# Patient Record
Sex: FEMALE | Race: BLACK OR AFRICAN AMERICAN | NOT HISPANIC OR LATINO | Employment: FULL TIME | URBAN - METROPOLITAN AREA
[De-identification: names, ages, dates, MRNs, and addresses within clinical notes are randomized per-mention and may not be internally consistent; named-entity substitution may affect disease eponyms.]

---

## 2017-01-06 ENCOUNTER — PREP FOR SURGERY (OUTPATIENT)
Dept: OBSTETRICS AND GYNECOLOGY | Facility: CLINIC | Age: 32
End: 2017-01-06

## 2017-01-06 DIAGNOSIS — N83.209 CYST OF OVARY, UNSPECIFIED LATERALITY: Primary | ICD-10-CM

## 2017-01-06 RX ORDER — SODIUM CHLORIDE 0.9 % (FLUSH) 0.9 %
1-10 SYRINGE (ML) INJECTION AS NEEDED
Status: CANCELLED | OUTPATIENT
Start: 2017-01-06

## 2017-01-16 ENCOUNTER — APPOINTMENT (OUTPATIENT)
Dept: PREADMISSION TESTING | Facility: HOSPITAL | Age: 32
End: 2017-01-16

## 2017-01-16 VITALS — WEIGHT: 158.73 LBS | BODY MASS INDEX: 29.97 KG/M2 | HEIGHT: 61 IN

## 2017-01-16 DIAGNOSIS — N80.9 ENDOMETRIOSIS: ICD-10-CM

## 2017-01-16 DIAGNOSIS — N83.209 CYST OF OVARY, UNSPECIFIED LATERALITY: ICD-10-CM

## 2017-01-16 LAB
BASOPHILS # BLD AUTO: 0.03 10*3/MM3 (ref 0–0.2)
BASOPHILS NFR BLD AUTO: 0.5 % (ref 0–1)
DEPRECATED RDW RBC AUTO: 40.5 FL (ref 37–54)
EOSINOPHIL # BLD AUTO: 0.21 10*3/MM3 (ref 0.1–0.3)
EOSINOPHIL NFR BLD AUTO: 3.7 % (ref 0–3)
ERYTHROCYTE [DISTWIDTH] IN BLOOD BY AUTOMATED COUNT: 12.2 % (ref 11.3–14.5)
FSH SERPL-ACNC: 1.2 MIU/ML
HCT VFR BLD AUTO: 40 % (ref 34.5–44)
HGB BLD-MCNC: 13.4 G/DL (ref 11.5–15.5)
IMM GRANULOCYTES # BLD: 0.02 10*3/MM3 (ref 0–0.03)
IMM GRANULOCYTES NFR BLD: 0.4 % (ref 0–0.6)
LYMPHOCYTES # BLD AUTO: 2.37 10*3/MM3 (ref 0.6–4.8)
LYMPHOCYTES NFR BLD AUTO: 41.9 % (ref 24–44)
MCH RBC QN AUTO: 30.4 PG (ref 27–31)
MCHC RBC AUTO-ENTMCNC: 33.5 G/DL (ref 32–36)
MCV RBC AUTO: 90.7 FL (ref 80–99)
MONOCYTES # BLD AUTO: 0.46 10*3/MM3 (ref 0–1)
MONOCYTES NFR BLD AUTO: 8.1 % (ref 0–12)
NEUTROPHILS # BLD AUTO: 2.56 10*3/MM3 (ref 1.5–8.3)
NEUTROPHILS NFR BLD AUTO: 45.4 % (ref 41–71)
PLATELET # BLD AUTO: 222 10*3/MM3 (ref 150–450)
PMV BLD AUTO: 11.1 FL (ref 6–12)
RBC # BLD AUTO: 4.41 10*6/MM3 (ref 3.89–5.14)
TSH SERPL DL<=0.05 MIU/L-ACNC: 1.03 MIU/ML (ref 0.35–5.35)
WBC NRBC COR # BLD: 5.65 10*3/MM3 (ref 3.5–10.8)

## 2017-01-16 PROCEDURE — 85025 COMPLETE CBC W/AUTO DIFF WBC: CPT | Performed by: OBSTETRICS & GYNECOLOGY

## 2017-01-16 PROCEDURE — 83001 ASSAY OF GONADOTROPIN (FSH): CPT | Performed by: OBSTETRICS & GYNECOLOGY

## 2017-01-16 PROCEDURE — 84443 ASSAY THYROID STIM HORMONE: CPT | Performed by: OBSTETRICS & GYNECOLOGY

## 2017-01-16 PROCEDURE — 36415 COLL VENOUS BLD VENIPUNCTURE: CPT

## 2017-01-17 ENCOUNTER — PREP FOR SURGERY (OUTPATIENT)
Dept: OBSTETRICS AND GYNECOLOGY | Facility: CLINIC | Age: 32
End: 2017-01-17

## 2017-01-17 NOTE — H&P
Patient Care Team:  No Known Provider as PCP - General    Chief complaint   Chronic pelvic pain post hysterectomy and left oopherectomy. Now eith right ovarian cyst.    Subjective     Patient is a 31 y.o. female presents with chronic pelvic pain. Onset of symptoms was gradual starting 1 year ago.  Symptoms are associated with nothing but worse with intercourse.  Symptoms are aggravated by intercourse.   Symptoms improve with none. Severity moderate Context as above Quality cramp like    Review of Systems   The following systems were reviewed and negative;  constitution, respiratory, cardiovascular, gastrointestinal, genitourinary, integument, hematologic / lymphatic, musculoskeletal, neurological, behavioral/psych and endocrine    History  Past Medical History   Diagnosis Date   • Abnormal Pap smear of cervix    • Endometriosis    • Migraine    • Urinary tract infection      Past Surgical History   Procedure Laterality Date   • Hysteroscopy     • Diagnostic laparoscopy       ovarian cyst, also ectopic pregancy left   • Tonsillectomy       Family History   Problem Relation Age of Onset   • Hypertension Father    • Heart disease Father    • Diabetes Paternal Grandmother    • Hypertension Paternal Grandmother    • Liver cancer Maternal Grandmother      Social History   Substance Use Topics   • Smoking status: Never Smoker   • Smokeless tobacco: Never Used   • Alcohol use 1.2 oz/week     2 Glasses of wine per week      Comment: per week       (Not in a hospital admission)  Allergies:  Penicillins    Objective     Vital Signs       Physical Exam:      General Appearance:    Alert, cooperative, in no acute distress   Head:    Normocephalic, without obvious abnormality, atraumatic   Eyes:            Lids and lashes normal, conjunctivae and sclerae normal, no   icterus, no pallor, corneas clear, PERRLA   Ears:    Ears appear intact with no abnormalities noted   Throat:   No oral lesions, no thrush, oral mucosa moist    Neck:   No adenopathy, supple, trachea midline, no thyromegaly, no     carotid bruit, no JVD   Back:     No kyphosis present, no scoliosis present, no skin lesions,       erythema or scars, no tenderness to percussion or                   palpation,   range of motion normal   Lungs:     Clear to auscultation,respirations regular, even and                   unlabored    Heart:    Regular rhythm and normal rate, normal S1 and S2, no            murmur, no gallop, no rub, no click   Breast Exam:    Deferred   Abdomen:     Normal bowel sounds, no masses, no organomegaly, soft        non-tender, non-distended, no guarding, no rebound                 tenderness   Genitalia:    Deferred   Extremities:   Moves all extremities well, no edema, no cyanosis, no              redness   Pulses:   Pulses palpable and equal bilaterally   Skin:   No bleeding, bruising or rash   Lymph nodes:   No palpable adenopathy   Neurologic:   Cranial nerves 2 - 12 grossly intact, sensation intact, DTR        present and equal bilaterally       Results Review:    I reviewed the patient's new imaging results and agree with the interpretation.    Assessment/Plan     Active Problems:    * No active hospital problems. *      Chronic pelvic pain  Right ovarian cyst  History of surgical removal of uterus and Left ovary    Plan laparoscopic ovarian cystectomy vs oopherectomyh    I discussed the patients findings and my recommendations with patient.     Troy Escobar MD  01/17/17  5:16 PM    Time: More than 50% of time spent in counseling and coordination of care:  Total face-to-face/floor time 30 min.  Time spent in counseling 25 min. Counseling included the following topics: surgical management risk benefits limitations ERT

## 2017-01-18 ENCOUNTER — ANESTHESIA EVENT (OUTPATIENT)
Dept: PERIOP | Facility: HOSPITAL | Age: 32
End: 2017-01-18

## 2017-01-18 ENCOUNTER — ANESTHESIA (OUTPATIENT)
Dept: PERIOP | Facility: HOSPITAL | Age: 32
End: 2017-01-18

## 2017-01-18 PROBLEM — N83.209 OVARIAN CYST: Status: ACTIVE | Noted: 2017-01-18

## 2017-01-18 PROCEDURE — 25010000002 FENTANYL CITRATE (PF) 100 MCG/2ML SOLUTION: Performed by: NURSE ANESTHETIST, CERTIFIED REGISTERED

## 2017-01-18 PROCEDURE — 25010000002 ONDANSETRON PER 1 MG: Performed by: NURSE ANESTHETIST, CERTIFIED REGISTERED

## 2017-01-18 PROCEDURE — 25010000002 PROPOFOL 10 MG/ML EMULSION: Performed by: NURSE ANESTHETIST, CERTIFIED REGISTERED

## 2017-01-18 PROCEDURE — 25010000002 DEXAMETHASONE PER 1 MG: Performed by: NURSE ANESTHETIST, CERTIFIED REGISTERED

## 2017-01-18 PROCEDURE — 25010000002 NEOSTIGMINE PER 0.5 MG: Performed by: NURSE ANESTHETIST, CERTIFIED REGISTERED

## 2017-01-18 PROCEDURE — 25010000002 MIDAZOLAM PER 1 MG: Performed by: NURSE ANESTHETIST, CERTIFIED REGISTERED

## 2017-01-18 RX ORDER — DEXAMETHASONE SODIUM PHOSPHATE 4 MG/ML
INJECTION, SOLUTION INTRA-ARTICULAR; INTRALESIONAL; INTRAMUSCULAR; INTRAVENOUS; SOFT TISSUE AS NEEDED
Status: DISCONTINUED | OUTPATIENT
Start: 2017-01-18 | End: 2017-01-18 | Stop reason: SURG

## 2017-01-18 RX ORDER — PROPOFOL 10 MG/ML
VIAL (ML) INTRAVENOUS CONTINUOUS PRN
Status: DISCONTINUED | OUTPATIENT
Start: 2017-01-18 | End: 2017-01-18 | Stop reason: SURG

## 2017-01-18 RX ORDER — ONDANSETRON 2 MG/ML
INJECTION INTRAMUSCULAR; INTRAVENOUS AS NEEDED
Status: DISCONTINUED | OUTPATIENT
Start: 2017-01-18 | End: 2017-01-18 | Stop reason: SURG

## 2017-01-18 RX ORDER — LIDOCAINE HYDROCHLORIDE 10 MG/ML
INJECTION, SOLUTION INFILTRATION; PERINEURAL AS NEEDED
Status: DISCONTINUED | OUTPATIENT
Start: 2017-01-18 | End: 2017-01-18 | Stop reason: SURG

## 2017-01-18 RX ORDER — ROCURONIUM BROMIDE 10 MG/ML
INJECTION, SOLUTION INTRAVENOUS AS NEEDED
Status: DISCONTINUED | OUTPATIENT
Start: 2017-01-18 | End: 2017-01-18 | Stop reason: SURG

## 2017-01-18 RX ORDER — GLYCOPYRROLATE 0.2 MG/ML
INJECTION INTRAMUSCULAR; INTRAVENOUS AS NEEDED
Status: DISCONTINUED | OUTPATIENT
Start: 2017-01-18 | End: 2017-01-18 | Stop reason: SURG

## 2017-01-18 RX ORDER — FENTANYL CITRATE 50 UG/ML
INJECTION, SOLUTION INTRAMUSCULAR; INTRAVENOUS AS NEEDED
Status: DISCONTINUED | OUTPATIENT
Start: 2017-01-18 | End: 2017-01-18 | Stop reason: SURG

## 2017-01-18 RX ORDER — MIDAZOLAM HYDROCHLORIDE 1 MG/ML
INJECTION INTRAMUSCULAR; INTRAVENOUS AS NEEDED
Status: DISCONTINUED | OUTPATIENT
Start: 2017-01-18 | End: 2017-01-18 | Stop reason: SURG

## 2017-01-18 RX ORDER — PROPOFOL 10 MG/ML
VIAL (ML) INTRAVENOUS AS NEEDED
Status: DISCONTINUED | OUTPATIENT
Start: 2017-01-18 | End: 2017-01-18 | Stop reason: SURG

## 2017-01-18 RX ADMIN — SODIUM CHLORIDE, POTASSIUM CHLORIDE, SODIUM LACTATE AND CALCIUM CHLORIDE: 600; 310; 30; 20 INJECTION, SOLUTION INTRAVENOUS at 10:08

## 2017-01-18 RX ADMIN — FENTANYL CITRATE 75 MCG: 50 INJECTION, SOLUTION INTRAMUSCULAR; INTRAVENOUS at 09:23

## 2017-01-18 RX ADMIN — GLYCOPYRROLATE 0.1 MG: 0.2 INJECTION, SOLUTION INTRAMUSCULAR; INTRAVENOUS at 09:21

## 2017-01-18 RX ADMIN — MIDAZOLAM HYDROCHLORIDE 2 MG: 1 INJECTION, SOLUTION INTRAMUSCULAR; INTRAVENOUS at 09:20

## 2017-01-18 RX ADMIN — LIDOCAINE HYDROCHLORIDE 50 MG: 10 INJECTION, SOLUTION INFILTRATION; PERINEURAL at 09:24

## 2017-01-18 RX ADMIN — ROCURONIUM BROMIDE 25 MG: 10 INJECTION INTRAVENOUS at 09:25

## 2017-01-18 RX ADMIN — DEXAMETHASONE SODIUM PHOSPHATE 8 MG: 4 INJECTION, SOLUTION INTRAMUSCULAR; INTRAVENOUS at 09:30

## 2017-01-18 RX ADMIN — GLYCOPYRROLATE 0.4 MG: 0.2 INJECTION, SOLUTION INTRAMUSCULAR; INTRAVENOUS at 10:00

## 2017-01-18 RX ADMIN — Medication 3 MG: at 10:00

## 2017-01-18 RX ADMIN — PROPOFOL 25 MCG/KG/MIN: 10 INJECTION, EMULSION INTRAVENOUS at 09:40

## 2017-01-18 RX ADMIN — GLYCOPYRROLATE 0.1 MG: 0.2 INJECTION, SOLUTION INTRAMUSCULAR; INTRAVENOUS at 09:35

## 2017-01-18 RX ADMIN — ONDANSETRON 4 MG: 2 INJECTION INTRAMUSCULAR; INTRAVENOUS at 09:55

## 2017-01-18 RX ADMIN — PROPOFOL 150 MG: 10 INJECTION, EMULSION INTRAVENOUS at 09:24

## 2017-02-02 ENCOUNTER — OFFICE VISIT (OUTPATIENT)
Dept: OBSTETRICS AND GYNECOLOGY | Facility: CLINIC | Age: 32
End: 2017-02-02

## 2017-02-02 VITALS
WEIGHT: 158 LBS | SYSTOLIC BLOOD PRESSURE: 110 MMHG | DIASTOLIC BLOOD PRESSURE: 62 MMHG | BODY MASS INDEX: 29.83 KG/M2 | HEIGHT: 61 IN

## 2017-02-02 DIAGNOSIS — R10.2 CHRONIC PELVIC PAIN IN FEMALE: Primary | ICD-10-CM

## 2017-02-02 DIAGNOSIS — G89.29 CHRONIC PELVIC PAIN IN FEMALE: Primary | ICD-10-CM

## 2017-02-02 PROBLEM — Z98.890 POSTOPERATIVE STATE: Status: ACTIVE | Noted: 2017-02-02

## 2017-02-02 PROCEDURE — 99024 POSTOP FOLLOW-UP VISIT: CPT | Performed by: OBSTETRICS & GYNECOLOGY

## 2017-04-03 ENCOUNTER — APPOINTMENT (OUTPATIENT)
Dept: GENERAL RADIOLOGY | Facility: HOSPITAL | Age: 32
End: 2017-04-03

## 2017-04-03 ENCOUNTER — HOSPITAL ENCOUNTER (EMERGENCY)
Facility: HOSPITAL | Age: 32
Discharge: HOME | End: 2017-04-03
Attending: EMERGENCY MEDICINE | Admitting: EMERGENCY MEDICINE

## 2017-04-03 VITALS
RESPIRATION RATE: 18 BRPM | WEIGHT: 158 LBS | BODY MASS INDEX: 29.83 KG/M2 | HEART RATE: 59 BPM | SYSTOLIC BLOOD PRESSURE: 134 MMHG | OXYGEN SATURATION: 98 % | TEMPERATURE: 98.1 F | DIASTOLIC BLOOD PRESSURE: 96 MMHG | HEIGHT: 61 IN

## 2017-04-03 DIAGNOSIS — S16.1XXA CERVICAL STRAIN, ACUTE, INITIAL ENCOUNTER: ICD-10-CM

## 2017-04-03 DIAGNOSIS — S29.019A THORACIC MYOFASCIAL STRAIN, INITIAL ENCOUNTER: ICD-10-CM
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
## 2017-04-03 PROCEDURE — 72040 X-RAY EXAM NECK SPINE 2-3 VW: CPT

## 2017-04-03 PROCEDURE — 72072 X-RAY EXAM THORAC SPINE 3VWS: CPT

## 2017-04-03 PROCEDURE — 99284 EMERGENCY DEPT VISIT MOD MDM: CPT

## 2017-04-03 RX ORDER — NAPROXEN 500 MG/1
500 TABLET ORAL 2 TIMES DAILY PRN
Qty: 14 TABLET | Refills: 0 | Status: SHIPPED | OUTPATIENT
Start: 2017-04-03 | End: 2017-10-10

## 2017-04-03 RX ORDER — NAPROXEN 500 MG/1
500 TABLET ORAL ONCE
Status: COMPLETED | OUTPATIENT
Start: 2017-04-03 | End: 2017-04-03

## 2017-04-03 RX ORDER — CYCLOBENZAPRINE HCL 10 MG
10 TABLET ORAL NIGHTLY PRN
Qty: 7 TABLET | Refills: 0 | Status: SHIPPED | OUTPATIENT
Start: 2017-04-03 | End: 2017-10-10

## 2017-04-03 RX ADMIN — NAPROXEN 500 MG: 500 TABLET ORAL at 20:13
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
## 2017-04-04 NOTE — DISCHARGE INSTRUCTIONS
Follow-up with your doctor in 3-5 days if not improving for further workup, treatment and evaluation.

## 2017-06-15 ENCOUNTER — TELEPHONE (OUTPATIENT)
Dept: URGENT CARE | Facility: CLINIC | Age: 32
End: 2017-06-15

## 2017-06-15 NOTE — TELEPHONE ENCOUNTER
Dr. Ramsey changed prescription to Cipro and sent it to her pharmacy, attempted to contact her to make sure she knew of the new Rx and benedryl as needed. Phone not accepting calls at this time.

## 2017-06-15 NOTE — TELEPHONE ENCOUNTER
----- Message from Karely Solano sent at 6/15/2017  9:55 AM EDT -----  Contact: PATIENT  SAYS SHES HAVING ALLERGIC RX TO MEDS, IF YOU COULD CHANGE IT.

## 2017-06-20 ENCOUNTER — TELEPHONE (OUTPATIENT)
Dept: URGENT CARE | Facility: CLINIC | Age: 32
End: 2017-06-20

## 2017-06-20 NOTE — TELEPHONE ENCOUNTER
----- Message from DIANE Quintero sent at 6/20/2017  9:20 AM EDT -----  Please call the patient regarding her abnormal result. Patient was allergic to the Bactrim, and it was switched to Cipro, which the bacteria is sensitive to. Check on patient's status. Her symptoms should be resolved.

## 2017-06-20 NOTE — TELEPHONE ENCOUNTER
Attempted to contact patient to see how she is doing, no change in treatment necessary, her phone is not accepting calls at this time.

## 2017-10-10 ENCOUNTER — OFFICE VISIT (OUTPATIENT)
Dept: OBSTETRICS AND GYNECOLOGY | Facility: CLINIC | Age: 32
End: 2017-10-10

## 2017-10-10 VITALS
SYSTOLIC BLOOD PRESSURE: 119 MMHG | BODY MASS INDEX: 25.3 KG/M2 | WEIGHT: 134 LBS | HEIGHT: 61 IN | DIASTOLIC BLOOD PRESSURE: 60 MMHG

## 2017-10-10 DIAGNOSIS — Z01.419 ENCOUNTER FOR GYNECOLOGICAL EXAMINATION WITHOUT ABNORMAL FINDING: ICD-10-CM

## 2017-10-10 DIAGNOSIS — N95.1 MENOPAUSAL SYMPTOMS: Primary | ICD-10-CM

## 2017-10-10 DIAGNOSIS — N93.0 POSTCOITAL BLEEDING: ICD-10-CM

## 2017-10-10 DIAGNOSIS — N95.2 POSTMENOPAUSAL ATROPHIC VAGINITIS: ICD-10-CM

## 2017-10-10 PROCEDURE — 99385 PREV VISIT NEW AGE 18-39: CPT | Performed by: OBSTETRICS & GYNECOLOGY

## 2017-10-10 NOTE — PROGRESS NOTES
Subjective  Chief Complaint   Patient presents with   • Gynecologic Exam     patient advised having spotting after intercourse, is having some vaginal dryness.    • Hot Flashes     Patient is 32 y.o.  here for annual with complaints of vaginal dryness and spotting post coitus.  Pt with previous LAVH/LSO  with Dr. Boyd secondary to endometriosis and ovarian cyst.  Pt had Dx lap with RSO 2017 with Dr. Escobar.  Pt is having vaginal dryness and pain with intercourse.  Pt had been given estradiol 1mg and reports taking x 2 months with no improvement so patient discontinued.  Pt with continued symptoms as well as hot flashes.    History  Past Medical History:   Diagnosis Date   • Abnormal Pap smear of cervix    • Endometriosis    • Migraine    • Ovarian cyst    • Urinary tract infection      No current outpatient prescriptions on file prior to visit.     No current facility-administered medications on file prior to visit.      Allergies   Allergen Reactions   • Penicillins Swelling   • Sulfa Antibiotics      Past Surgical History:   Procedure Laterality Date   • DIAGNOSTIC LAPAROSCOPY      ovarian cyst, also ectopic pregancy left   • ECTOPIC PREGNANCY Right     WITH RSO, @    • HYSTERECTOMY      [facility] @ [facility]   • HYSTEROSCOPY     • OOPHORECTOMY Left    • OVARIAN CYST DRAINAGE/EXCISION N/A 2017    Procedure: LAPAROSCOPIC RIGHT SALPINGOOPHORECTOMY;  Surgeon: Troy Escobar MD;  Location: [facility];  Service:    • TONSILLECTOMY       Family History   Problem Relation Age of Onset   • Hypertension Father    • Heart disease Father    • Diabetes Paternal Grandmother    • Hypertension Paternal Grandmother    • Liver cancer Maternal Grandmother      Social History     Social History   • Marital status:      Spouse name: N/A   • Number of children: N/A   • Years of education: N/A     Social History Main Topics   • Smoking status: Never Smoker   • Smokeless tobacco: Never  "Used   • Alcohol use 1.2 oz/week     2 Glasses of wine per week      Comment: per week   • Drug use: No   • Sexual activity: Yes     Partners: Male     Other Topics Concern   • None     Social History Narrative     Review of Systems  All systems were reviewed and negative except for:  Genitourinary: postivie for  abnormal vaginal bleeding, painful intercourse and pelvic pain  Endocrine: positive for  hot flashes     Objective  Vitals:    10/10/17 1534   BP: 119/60   Weight: 134 lb (60.8 kg)   Height: 61\" (154.9 cm)     Physical Exam:  General Appearance: alert, appears stated age and cooperative  Head: normocephalic, without obvious abnormality and atraumatic  Eyes: lids and lashes normal, conjunctivae and sclerae normal, no icterus, no pallor, corneas clear and PERRLA  Ears: ears appear intact with no abnormalities noted  Nose: nares normal, septum midline, mucosa normal and no drainage  Neck: suppple, trachea midline and no thyromegaly  Lungs: clear to auscultation, respirations regular, respirations even and respirations unlabored  Heart: regular rhythm and normal rate, normal S1, S2, no murmur, gallop, or rubs and no click  Breasts: Examined in supine position  Symmetric without masses or skin dimpling  Nipples normal without inversion, lesions or discharge  There are no palpable axillary nodes  Abdomen: normal bowel sounds, no masses, no hepatomegaly, no splenomegaly, soft non-tender, no guarding and no rebound tenderness  Pelvic: Clinical staff was present for exam  External genitalia:  normal appearance of the external genitalia including Bartholin's and [place]'s glands.  :  urethral meatus normal;  Vaginal:  atrophic mucosal changes are present;  Cervix:  absent.  Uterus:  absent.  Adnexa:  normal bimanual exam of the adnexa.  Extremities: moves extremities well, no edema, no cyanosis and no redness  Skin: no bleeding, bruising or rash and no lesions noted  Lymph Nodes: no palpable adenopathy  Psych: normal " mood and affect, oriented to person, time and place, thought content organized and appropriate judgment    Lab Review   No data reviewed    Imaging   No data reviewed    Assessment/Plan    Problem List Items Addressed This Visit     None      Visit Diagnoses     Menopausal symptoms    -  Primary  Various options discussed with patient.  Plan trial with premarin as given; pt to call if no improvement in sx's in 4-6 weeks.    Relevant Medications    estrogens, conjugated, (PREMARIN) 1.25 MG tablet    conjugated estrogens (PREMARIN) 0.625 MG/GM vaginal cream    Postmenopausal atrophic vaginitis      Discussed various options for relief of atrophic vaginal symptoms related to menopause. Discussed local therapy for treatment of vaginal symptoms only.  Discussed the different formulation options including cream, ring, and tablets.  Discussed the low risk of systemic absorption in postmenopausal women with atrophy using 25 mcgs of estradiol on a daily basis.  Recommend low dose use 2-3x/wk for maintenance of treatment.  Other treatment options were discussed including the use of water-based and silicone-based vaginal lubricants and moisturizers.  Also discussed was the FDA approved treatment option of ospemifene for moderate to severe dyspareunia.  Rx premarin cream given as noted.    Relevant Medications    estrogens, conjugated, (PREMARIN) 1.25 MG tablet    conjugated estrogens (PREMARIN) 0.625 MG/GM vaginal cream    Postcoital bleeding      Plan as noted above; no abnormalities noted; pt to call if no improvement in 4-6 weeks.    Relevant Medications    estrogens, conjugated, (PREMARIN) 1.25 MG tablet    conjugated estrogens (PREMARIN) 0.625 MG/GM vaginal cream    Other Relevant Orders    Pap IG, Rfx HPV ASCU - ThinPrep Vial, Cervix    Encounter for gynecological examination without abnormal finding      I explained to Charlee that the Pap smears are no longer recommended in patients after hysterectomy unless the patient  has a history of UMER 2 or higher in the past 20 years or have a history of cervical cancer.    I stressed to Charlee that she still should be seen to be seen yearly for a full physical including breast and pelvic exam. In women with no risk factors as noted, cytology screening has a small chance of detecting an abnormality and primary vaginal cancer is a rare gynecologic malignancy.  For this reason, Charlee has elected pap smear screening this year.    Relevant Orders    Pap IG, Rfx HPV ASCU - ThinPrep Vial, Cervix            Follow up 6 weeks or prn     This note was electronically signed.  Veronica Cotto M.D.

## 2017-10-20 DIAGNOSIS — N93.0 POSTCOITAL BLEEDING: ICD-10-CM

## 2017-10-20 DIAGNOSIS — Z01.419 ENCOUNTER FOR GYNECOLOGICAL EXAMINATION WITHOUT ABNORMAL FINDING: ICD-10-CM

## 2018-07-11 ENCOUNTER — OFFICE VISIT (OUTPATIENT)
Dept: OBSTETRICS AND GYNECOLOGY | Facility: CLINIC | Age: 33
End: 2018-07-11

## 2018-07-11 VITALS
SYSTOLIC BLOOD PRESSURE: 118 MMHG | WEIGHT: 130 LBS | HEIGHT: 61 IN | BODY MASS INDEX: 24.55 KG/M2 | DIASTOLIC BLOOD PRESSURE: 58 MMHG

## 2018-07-11 DIAGNOSIS — N64.52 BREAST DISCHARGE: Primary | ICD-10-CM

## 2018-07-11 DIAGNOSIS — N94.10 DYSPAREUNIA, FEMALE: ICD-10-CM

## 2018-07-11 DIAGNOSIS — N95.1 MENOPAUSAL SYMPTOMS: ICD-10-CM

## 2018-07-11 PROCEDURE — 99214 OFFICE O/P EST MOD 30 MIN: CPT | Performed by: OBSTETRICS & GYNECOLOGY

## 2018-07-11 NOTE — PROGRESS NOTES
Subjective  Chief Complaint   Patient presents with   • Breast Discharge     Patient advised is having nipple discharge from left breast x 2 weeks. Patient has had nipple piercing x 8 years.      Patient is 32 y.o.  here for evaluation of nipple discharge of white substance.  Pt reports it is intermittent.  Pt will have white nodule on tip of nipple left breast.  Pt can wipe this off then it returns on its own.  Pt with no erythema, tenderness.  Pt does have bilateral nipple piercing but has had for 8 years.  Pt denies any changes in vision, headaches.  Pt with no family history of breast ca.  Pt does have complaints of hot flashes, night sweats.  Pt with history of endometriosis; s/p prior TLH/LSO and 2017 had RSO.  Pt has not been on any HRT.  Pt also with complaints of vaginal dryness and pain with intercourse.  Pt has a picture of breast abnormality showing white discharge left nipple.    History  Past Medical History:   Diagnosis Date   • Abnormal Pap smear of cervix    • Body piercing    • Endometriosis    • Migraine    • Ovarian cyst    • Urinary tract infection      No current outpatient prescriptions on file prior to visit.     No current facility-administered medications on file prior to visit.      Allergies   Allergen Reactions   • Penicillins Swelling   • Sulfa Antibiotics      Past Surgical History:   Procedure Laterality Date   • DIAGNOSTIC LAPAROSCOPY      ovarian cyst, also ectopic pregancy left   • ECTOPIC PREGNANCY Right     WITH RSO, @ [facility]   • HYSTERECTOMY      [facility]   • HYSTEROSCOPY     • OOPHORECTOMY Left    • OVARIAN CYST DRAINAGE/EXCISION N/A 2017    Procedure: LAPAROSCOPIC RIGHT SALPINGOOPHORECTOMY;  Surgeon: Troy Escobar MD;  Location: [facility];  Service:    • TONSILLECTOMY       Family History   Problem Relation Age of Onset   • Hypertension Father    • Heart disease Father    • Diabetes Paternal Grandmother    • Hypertension Paternal  "Grandmother    • Liver cancer Maternal Grandmother      Social History     Social History   • Marital status:      Social History Main Topics   • Smoking status: Never Smoker   • Smokeless tobacco: Never Used   • Alcohol use 1.2 oz/week     2 Glasses of wine per week      Comment: per week   • Drug use: No   • Sexual activity: Yes     Partners: Male     Other Topics Concern   • Not on file     Review of Systems  All systems were reviewed and negative except for:  Breast:  positive for nipple discharge     Objective  Vitals:    07/11/18 1335   BP: 118/58   Weight: 59 kg (130 lb)   Height: 154.9 cm (61\")     Physical Exam:  General Appearance: alert, appears stated age and cooperative  Head: normocephalic, without obvious abnormality and atraumatic  Eyes: lids and lashes normal, conjunctivae and sclerae normal, no icterus, no pallor, corneas clear and PERRLA  Ears: ears appear intact with no abnormalities noted  Nose: nares normal, septum midline, mucosa normal and no drainage  Neck: suppple, trachea midline and no thyromegaly  Lungs: clear to auscultation, respirations regular, respirations even and respirations unlabored  Heart: regular rhythm and normal rate, normal S1, S2, no murmur, gallop, or rubs and no click  Breasts: Examined in supine position  There are no palpable axillary nodes  Nipple piercings are present bilaterally   No palpable masses bilaterally other than fibrocystic changes; unable to express any drainage today; nipples everted with piercing's as noted  Abdomen: normal bowel sounds, no masses, no hepatomegaly, no splenomegaly, soft non-tender, no guarding and no rebound tenderness  Pelvic: Not performed.  Extremities: moves extremities well, no edema, no cyanosis and no redness  Skin: no bleeding, bruising or rash and no lesions noted  Lymph Nodes: no palpable adenopathy  Neuro: CN II-X grossly intact; sensation intact  Psych: normal mood and affect, oriented to person, time and place, " thought content organized and appropriate judgment  Lab Review   No data reviewed    Imaging   No data reviewed    Assessment/Plan  Problem List Items Addressed This Visit     None      Visit Diagnoses     Breast discharge    -  Primary New  Will check following labs as noted.  Schedule left breast ultrasound.  Plan pending results.  Pt informed may need referral to general surgeon.    Relevant Orders    Thyroid Panel With TSH    Prolactin    US Breast Left Complete    Menopausal symptoms    Worsening  Will check following labs today.  Discussed HRT but would recommend patient hold pending further evaluation of breast.    Relevant Orders    Follicle Stimulating Hormone    Estradiol    Testosterone, Free, Total    Dyspareunia, female    Worsening  Will check labs as noted.  Plan pending results.    Relevant Orders    Follicle Stimulating Hormone    Estradiol    Testosterone, Free, Total        Follow up as discussed   This note was electronically signed.  Veronica Cotto M.D.

## 2018-07-12 LAB
ESTRADIOL SERPL-MCNC: <5 PG/ML
FSH SERPL-ACNC: 132.2 MIU/ML
FT4I SERPL CALC-MCNC: 1.7 (ref 1.2–4.9)
PROLACTIN SERPL-MCNC: 9 NG/ML (ref 4.8–23.3)
T3RU NFR SERPL: 29 % (ref 24–39)
T4 SERPL-MCNC: 5.7 UG/DL (ref 4.5–12)
TESTOST FREE SERPL-MCNC: 1.9 PG/ML (ref 0–4.2)
TESTOST SERPL-MCNC: 14 NG/DL (ref 8–48)
TSH SERPL DL<=0.005 MIU/L-ACNC: 0.98 UIU/ML (ref 0.45–4.5)

## 2018-07-13 ENCOUNTER — TELEPHONE (OUTPATIENT)
Dept: OBSTETRICS AND GYNECOLOGY | Facility: CLINIC | Age: 33
End: 2018-07-13

## 2018-07-13 ENCOUNTER — HOSPITAL ENCOUNTER (OUTPATIENT)
Dept: ULTRASOUND IMAGING | Facility: HOSPITAL | Age: 33
Discharge: HOME | End: 2018-07-13
Attending: OBSTETRICS & GYNECOLOGY | Admitting: OBSTETRICS & GYNECOLOGY

## 2018-07-13 DIAGNOSIS — R92.8 ABNORMAL MAMMOGRAM: Primary | ICD-10-CM

## 2018-07-13 DIAGNOSIS — N64.52 BREAST DISCHARGE: ICD-10-CM

## 2018-07-13 PROCEDURE — 76641 ULTRASOUND BREAST COMPLETE: CPT

## 2018-07-13 NOTE — TELEPHONE ENCOUNTER
----- Message from Veronica Cotto MD sent at 7/13/2018 11:36 AM EDT -----  Okay to inform pt ultrasound showed small cyst and mild ductal dilitation; recommend patient see general surgeon.

## 2018-07-31 ENCOUNTER — OFFICE VISIT (OUTPATIENT)
Dept: SURGERY | Facility: CLINIC | Age: 33
End: 2018-07-31

## 2018-07-31 VITALS
TEMPERATURE: 98.4 F | OXYGEN SATURATION: 98 % | WEIGHT: 127 LBS | DIASTOLIC BLOOD PRESSURE: 84 MMHG | RESPIRATION RATE: 18 BRPM | SYSTOLIC BLOOD PRESSURE: 132 MMHG | HEART RATE: 62 BPM | HEIGHT: 61 IN | BODY MASS INDEX: 23.98 KG/M2

## 2018-07-31 DIAGNOSIS — N64.4 BREAST PAIN, LEFT: Primary | ICD-10-CM

## 2018-07-31 PROCEDURE — 99203 OFFICE O/P NEW LOW 30 MIN: CPT | Performed by: SURGERY

## 2018-11-28 ENCOUNTER — TELEPHONE (OUTPATIENT)
Dept: OBSTETRICS AND GYNECOLOGY | Facility: CLINIC | Age: 33
End: 2018-11-28

## 2018-12-03 ENCOUNTER — OFFICE VISIT (OUTPATIENT)
Dept: OBSTETRICS AND GYNECOLOGY | Facility: CLINIC | Age: 33
End: 2018-12-03

## 2018-12-03 VITALS
DIASTOLIC BLOOD PRESSURE: 78 MMHG | BODY MASS INDEX: 27.56 KG/M2 | HEIGHT: 61 IN | SYSTOLIC BLOOD PRESSURE: 118 MMHG | WEIGHT: 146 LBS

## 2018-12-03 DIAGNOSIS — N64.3 GALACTORRHEA: ICD-10-CM

## 2018-12-03 DIAGNOSIS — N60.19 FIBROCYSTIC BREAST DISEASE (FCBD), UNSPECIFIED LATERALITY: Primary | ICD-10-CM

## 2018-12-03 PROCEDURE — 99215 OFFICE O/P EST HI 40 MIN: CPT | Performed by: OBSTETRICS & GYNECOLOGY

## 2018-12-03 NOTE — PATIENT INSTRUCTIONS
Breast Self-Awareness  Breast self-awareness means:  · Knowing how your breasts look.  · Knowing how your breasts feel.  · Checking your breasts every month for changes.  · Telling your doctor if you notice a change in your breasts.    Breast self-awareness allows you to notice a breast problem early while it is still small.  How to do a breast self-exam  One way to learn what is normal for your breasts and to check for changes is to do a breast self-exam. To do a breast self-exam:  Look for Changes    1. Take off all the clothes above your waist.  2.  front of a mirror in a room with good lighting.  3. Put your hands on your hips.  4. Push your hands down.  5. Look at your breasts and nipples in the mirror to see if one breast or nipple looks different than the other. Check to see if:  ? The shape of one breast is different.  ? The size of one breast is different.  ? There are wrinkles, dips, and bumps in one breast and not the other.  6. Look at each breast for changes in your skin, such as:  ? Redness.  ? Scaly areas.  7. Look for changes in your nipples, such as:  ? Liquid around the nipples.  ? Bleeding.  ? Dimpling.  ? Redness.  ? A change in where the nipples are.  Feel for Changes  1. Lie on your back on the floor.  2. Feel each breast. To do this, follow these steps:  ? Pick a breast to feel.  ? Put the arm closest to that breast above your head.  ? Use your other arm to feel the nipple area of your breast. Feel the area with the pads of your three middle fingers by making small circles with your fingers. For the first [place], press lightly. For the second [place], press harder. For the third [place], press even harder.  ? Keep making circles with your fingers at the light, harder, and even harder pressures as you move down your breast. Stop when you feel your ribs.  ? Move your fingers a little toward the center of your body.  ? Start making circles with your fingers again, this time going up until  you reach your collarbone.  ? Keep making up and down circles until you reach your armpit. Remember to keep using the three pressures.  ? Feel the other breast in the same way.  3. Sit or  the shower or tub.  4. With soapy water on your skin, feel each breast the same way you did in step 2, when you were lying on the floor.  Write Down What You Find    After doing the self-exam, write down:  · What is normal for each breast.  · Any changes you find in each breast.  · When you last had your period.    How often should I check my breasts?  Check your breasts every month. If you are breastfeeding, the best time to check them is after you feed your baby or after you use a breast pump. If you get periods, the best time to check your breasts is 5-7 days after your period is over.  When should I see my doctor?  See your doctor if you notice:  · A change in shape or size of your breasts or nipples.  · A change in the skin of your breast or nipples, such as red or scaly skin.  · Unusual fluid coming from your nipples.  · A lump or thick area that was not there before.  · Pain in your breasts.  · Anything that concerns you.    This information is not intended to replace advice given to you by your health care provider. Make sure you discuss any questions you have with your health care provider.  Document Released: 06/05/2009 Document Revised: 05/25/2017 Document Reviewed: 11/06/2016  Elsevier Interactive Patient Education © 2018 [employer]    Fibrocystic Breast Changes  Fibrocystic breast changes are changes that can make your breasts swollen or painful. These changes happen when tiny sacs of fluid (cysts) form in the breast. This is a common condition. It does not mean that you have cancer. It usually happens because of hormone changes during a monthly period.  Follow these instructions at home:  · Check your breasts after every monthly period. If you do not have monthly periods, check your breasts on the first  day of every month. Check for:  ? Soreness.  ? New swelling or puffiness.  ? A change in breast size.  ? A change in a lump that was already there.  · Take over-the-counter and prescription medicines only as told by your doctor.  · Wear a support or sports bra that fits well. Wear this support especially when you are exercising.  · Avoid or have less caffeine, fat, and sugar in what you eat and drink as told by your doctor.  Contact a doctor if:  · You have fluid coming from your nipple, especially if the fluid has blood in it.  · You have new lumps or bumps in your breast.  · Your breast gets puffy, red, and painful.  · You have changes in how your breast looks.  · Your nipple looks flat or it sinks into your breast.  Get help right away if:  · Your breast turns red, and the redness is spreading.  Summary  · Fibrocystic breast changes are changes that can make your breasts swollen or painful.  · This condition can happen when you have hormone changes during your monthly period.  · With this condition, it is important to check your breasts after every monthly period. If you do not have monthly periods, check your breasts on the first day of every month.  This information is not intended to replace advice given to you by your health care provider. Make sure you discuss any questions you have with your health care provider.  Document Released: 11/30/2009 Document Revised: 08/31/2017 Document Reviewed: 08/31/2017  [employer] Interactive Patient Education © 2017 [employer]
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
## 2019-01-09 ENCOUNTER — OFFICE VISIT (OUTPATIENT)
Dept: OBSTETRICS AND GYNECOLOGY | Facility: CLINIC | Age: 34
End: 2019-01-09

## 2019-01-09 VITALS — WEIGHT: 146 LBS | HEIGHT: 61 IN | BODY MASS INDEX: 27.56 KG/M2

## 2019-01-09 DIAGNOSIS — E89.41 SURGICAL MENOPAUSE, SYMPTOMATIC: ICD-10-CM

## 2019-01-09 DIAGNOSIS — N95.1 MENOPAUSAL SYMPTOMS: Primary | ICD-10-CM

## 2019-01-09 PROCEDURE — 99214 OFFICE O/P EST MOD 30 MIN: CPT | Performed by: OBSTETRICS & GYNECOLOGY

## 2019-01-09 RX ORDER — ESTRADIOL 0.05 MG/D
1 FILM, EXTENDED RELEASE TRANSDERMAL 2 TIMES WEEKLY
Qty: 8 PATCH | Refills: 0 | Status: SHIPPED | OUTPATIENT
Start: 2019-01-10 | End: 2019-02-04 | Stop reason: SDUPTHER

## 2019-01-09 NOTE — PROGRESS NOTES
CC: Hot flushes  HPI:  This 32 yo is about 18 mos post having had her remaining ovary removed laparoscopically for chronic pain. She has been on ERT intermittently with little consistency and little success. She now reports progressive increase in HF and night sweats. These are really the only menopausal symptoms that are bothering her. Certainly no major mood issue or vaginal dryness or problems with intercourse. She reports HF that would be described by her as severe occurring 5 times per day with interfere with her activities of daily living. She is awakened twice nightly with HF and sweating. She describes the HF as is typical with hot feelings over face and head and some body followed by profuse seating of head and chest.She was made aware of hormonal, primarily estrogen, and non-hormonal approaches like SSRI's.  ROS  14- point neg  FCD of Breast improved since last visit  VS  Reviewed  Labs including hormone profile reviewed  Medication history reviewed  Physical exam not performed    Impression  Surgical menopause in 32 yo  Vasomotor symptoms, moderate to severe  Fibrocystic disease of the breast, stable    Plan:  Begin ERT  Risk and benefits of estrogen discussed including the risk of DVT as well as other nusance side effects like but not limited to nausea and breast tenderness. We discussed the dosing and route of delivery. Transdermal, IM, vaginal, oral benefits and limitations.We discussed onset of action and relief of symptoms expectation. She opts for transdermal and we discussed the dosing and practical use of the products available.    Return 4 weeks to assess response, tolerance, complinace etc    I personally spent over half, 20 miniutes of a total 25 minutes face to face with the patient in counseling and discussion and/or coordination of care for moderate to severe vasomotor symptoms in the young, surgically menopausal patient. We discussed hormonal and non-hormonal approaches. She was made aware  of hormonal, primarily estrogen, and non-hormonal approaches like SSRI's.Will begin Estradiol patch 0.05  Twice weekly. Risk and benefits of estrogen discussed including the risk of DVT as well as other nusance side effects like but not limited to nausea and breast tenderness. We discussed the dosing and route of delivery. Transdermal, IM, vaginal, oral benefits and limitations.We discussed onset of action and relief of symptoms expectation.

## 2019-01-16 RX ORDER — FLUCONAZOLE 150 MG/1
150 TABLET ORAL ONCE
Qty: 1 TABLET | Refills: 1 | Status: SHIPPED | OUTPATIENT
Start: 2019-01-16 | End: 2019-01-17

## 2019-01-16 RX ORDER — METRONIDAZOLE 7.5 MG/G
GEL VAGINAL 2 TIMES DAILY
Qty: 1 TUBE | Refills: 0 | Status: SHIPPED | OUTPATIENT
Start: 2019-01-16 | End: 2019-02-04

## 2019-01-16 RX ORDER — FLUCONAZOLE 150 MG/1
150 TABLET ORAL ONCE
Qty: 1 TABLET | Refills: 1 | Status: SHIPPED | OUTPATIENT
Start: 2019-01-16 | End: 2019-01-16 | Stop reason: SDUPTHER

## 2019-01-16 RX ORDER — METRONIDAZOLE 7.5 MG/G
1 GEL VAGINAL 2 TIMES DAILY
Qty: 70 G | Refills: 0 | Status: SHIPPED | OUTPATIENT
Start: 2019-01-16 | End: 2019-02-04

## 2019-02-04 ENCOUNTER — OFFICE VISIT (OUTPATIENT)
Dept: OBSTETRICS AND GYNECOLOGY | Facility: CLINIC | Age: 34
End: 2019-02-04

## 2019-02-04 VITALS — DIASTOLIC BLOOD PRESSURE: 68 MMHG | HEIGHT: 61 IN | BODY MASS INDEX: 27.59 KG/M2 | SYSTOLIC BLOOD PRESSURE: 118 MMHG

## 2019-02-04 DIAGNOSIS — E89.41 SURGICAL MENOPAUSE, SYMPTOMATIC: ICD-10-CM

## 2019-02-04 DIAGNOSIS — R39.9 UTI SYMPTOMS: Primary | ICD-10-CM

## 2019-02-04 DIAGNOSIS — Z01.419 WOMEN'S ANNUAL ROUTINE GYNECOLOGICAL EXAMINATION: ICD-10-CM

## 2019-02-04 LAB
BACTERIA UR QL AUTO: ABNORMAL /HPF
BILIRUB UR QL STRIP: NEGATIVE
CLARITY UR: CLEAR
COLOR UR: YELLOW
GLUCOSE UR STRIP-MCNC: NEGATIVE MG/DL
HGB UR QL STRIP.AUTO: ABNORMAL
HYALINE CASTS UR QL AUTO: ABNORMAL /LPF
KETONES UR QL STRIP: NEGATIVE
LEUKOCYTE ESTERASE UR QL STRIP.AUTO: NEGATIVE
NITRITE UR QL STRIP: NEGATIVE
PH UR STRIP.AUTO: <=5 [PH] (ref 5–8)
PROT UR QL STRIP: NEGATIVE
RBC # UR: ABNORMAL /HPF
REF LAB TEST METHOD: ABNORMAL
SP GR UR STRIP: 1.03 (ref 1–1.03)
SQUAMOUS #/AREA URNS HPF: ABNORMAL /HPF
UROBILINOGEN UR QL STRIP: ABNORMAL
WBC UR QL AUTO: ABNORMAL /HPF

## 2019-02-04 PROCEDURE — 87086 URINE CULTURE/COLONY COUNT: CPT | Performed by: OBSTETRICS & GYNECOLOGY

## 2019-02-04 PROCEDURE — 81001 URINALYSIS AUTO W/SCOPE: CPT | Performed by: OBSTETRICS & GYNECOLOGY

## 2019-02-04 PROCEDURE — 99212 OFFICE O/P EST SF 10 MIN: CPT | Performed by: OBSTETRICS & GYNECOLOGY

## 2019-02-04 PROCEDURE — 99395 PREV VISIT EST AGE 18-39: CPT | Performed by: OBSTETRICS & GYNECOLOGY

## 2019-02-04 RX ORDER — NITROFURANTOIN 25; 75 MG/1; MG/1
100 CAPSULE ORAL 2 TIMES DAILY
Qty: 14 CAPSULE | Refills: 0 | Status: SHIPPED | OUTPATIENT
Start: 2019-02-04 | End: 2019-02-11

## 2019-02-04 RX ORDER — ESTRADIOL 0.05 MG/D
1 FILM, EXTENDED RELEASE TRANSDERMAL 2 TIMES WEEKLY
Qty: 8 PATCH | Refills: 12 | Status: SHIPPED | OUTPATIENT
Start: 2019-02-04 | End: 2019-03-18
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
## 2019-02-06 ENCOUNTER — TELEPHONE (OUTPATIENT)
Dept: OBSTETRICS AND GYNECOLOGY | Facility: CLINIC | Age: 34
End: 2019-02-06

## 2019-02-06 LAB — BACTERIA SPEC AEROBE CULT: NORMAL

## 2019-02-06 NOTE — TELEPHONE ENCOUNTER
As it turns out, the culture showed no UTI. So I guess the back pain is probably not due to a UTI. She may need to see PCP or [facility]

## 2019-02-07 ENCOUNTER — DOCUMENTATION (OUTPATIENT)
Dept: OBSTETRICS AND GYNECOLOGY | Facility: CLINIC | Age: 34
End: 2019-02-07

## 2019-02-20 ENCOUNTER — TELEPHONE (OUTPATIENT)
Dept: OBSTETRICS AND GYNECOLOGY | Facility: CLINIC | Age: 34
End: 2019-02-20

## 2019-02-20 NOTE — TELEPHONE ENCOUNTER
Patient came into the office to discuss her visit to [facility].  We referred her to PCP for back pain. She went to [facility] because she did not have a PCP.  Patient states they did some test and advised her she had a STD was given a prescription and injection.  She is concerned about test results. Patient advised to sign release of medical records so Dr Escobar could review.

## 2019-05-14 ENCOUNTER — LAB (OUTPATIENT)
Dept: LAB | Facility: HOSPITAL | Age: 34
End: 2019-05-14

## 2019-05-14 ENCOUNTER — TELEPHONE (OUTPATIENT)
Dept: OBSTETRICS AND GYNECOLOGY | Facility: CLINIC | Age: 34
End: 2019-05-14

## 2019-05-14 DIAGNOSIS — R39.9 UTI SYMPTOMS: Primary | ICD-10-CM

## 2019-05-14 DIAGNOSIS — R39.9 UTI SYMPTOMS: ICD-10-CM

## 2019-05-14 LAB
BACTERIA UR QL AUTO: ABNORMAL /HPF
BILIRUB UR QL STRIP: NEGATIVE
CLARITY UR: ABNORMAL
COLOR UR: YELLOW
GLUCOSE UR STRIP-MCNC: NEGATIVE MG/DL
HGB UR QL STRIP.AUTO: ABNORMAL
HYALINE CASTS UR QL AUTO: ABNORMAL /LPF
KETONES UR QL STRIP: ABNORMAL
LEUKOCYTE ESTERASE UR QL STRIP.AUTO: NEGATIVE
MUCOUS THREADS URNS QL MICRO: ABNORMAL /HPF
NITRITE UR QL STRIP: NEGATIVE
PH UR STRIP.AUTO: 5.5 [PH] (ref 5–8)
PROT UR QL STRIP: NEGATIVE
RBC # UR: ABNORMAL /HPF
REF LAB TEST METHOD: ABNORMAL
SP GR UR STRIP: 1.03 (ref 1–1.03)
SQUAMOUS #/AREA URNS HPF: ABNORMAL /HPF
UROBILINOGEN UR QL STRIP: ABNORMAL
WBC UR QL AUTO: ABNORMAL /HPF

## 2019-05-14 PROCEDURE — 87086 URINE CULTURE/COLONY COUNT: CPT

## 2019-05-14 PROCEDURE — 81001 URINALYSIS AUTO W/SCOPE: CPT

## 2019-05-14 RX ORDER — NITROFURANTOIN 25; 75 MG/1; MG/1
100 CAPSULE ORAL 2 TIMES DAILY
Qty: 14 CAPSULE | Refills: 0 | Status: SHIPPED | OUTPATIENT
Start: 2019-05-14 | End: 2019-05-21

## 2019-05-14 NOTE — TELEPHONE ENCOUNTER
Patient advised of urinalysis results and prescription has been called into [facility]

## 2019-05-15 LAB — BACTERIA SPEC AEROBE CULT: NO GROWTH

## 2020-06-01 ENCOUNTER — LAB (OUTPATIENT)
Dept: LAB | Facility: HOSPITAL | Age: 35
End: 2020-06-01

## 2020-06-01 ENCOUNTER — TRANSCRIBE ORDERS (OUTPATIENT)
Dept: LAB | Facility: HOSPITAL | Age: 35
End: 2020-06-01

## 2020-06-01 DIAGNOSIS — O92.6 GALACTORRHEA, WITH DELIVERY, WITH MENTION OF POSTPARTUM COMPLICATION: ICD-10-CM

## 2020-06-01 DIAGNOSIS — O92.6 GALACTORRHEA, WITH DELIVERY, WITH MENTION OF POSTPARTUM COMPLICATION: Primary | ICD-10-CM

## 2020-06-01 LAB
PROLACTIN SERPL-MCNC: 7.43 NG/ML (ref 4.79–23.3)
T4 FREE SERPL-MCNC: 1.06 NG/DL (ref 0.93–1.7)
TSH SERPL DL<=0.05 MIU/L-ACNC: 0.56 UIU/ML (ref 0.27–4.2)

## 2020-06-01 PROCEDURE — 84443 ASSAY THYROID STIM HORMONE: CPT

## 2020-06-01 PROCEDURE — 84439 ASSAY OF FREE THYROXINE: CPT

## 2020-06-01 PROCEDURE — 84146 ASSAY OF PROLACTIN: CPT

## 2020-06-01 PROCEDURE — 36415 COLL VENOUS BLD VENIPUNCTURE: CPT

## 2020-06-03 ENCOUNTER — TRANSCRIBE ORDERS (OUTPATIENT)
Dept: ADMINISTRATIVE | Facility: HOSPITAL | Age: 35
End: 2020-06-03

## 2020-06-03 DIAGNOSIS — N63.42 UNSPECIFIED LUMP IN LEFT BREAST, SUBAREOLAR: Primary | ICD-10-CM

## 2020-06-26 ENCOUNTER — APPOINTMENT (OUTPATIENT)
Dept: OTHER | Facility: HOSPITAL | Age: 35
End: 2020-06-26

## 2020-06-26 ENCOUNTER — HOSPITAL ENCOUNTER (OUTPATIENT)
Dept: MAMMOGRAPHY | Facility: HOSPITAL | Age: 35
Discharge: HOME | End: 2020-06-26
Admitting: NURSE PRACTITIONER

## 2020-06-26 ENCOUNTER — HOSPITAL ENCOUNTER (OUTPATIENT)
Dept: ULTRASOUND IMAGING | Facility: HOSPITAL | Age: 35
Discharge: HOME | End: 2020-06-26

## 2020-06-26 DIAGNOSIS — N63.42 UNSPECIFIED LUMP IN LEFT BREAST, SUBAREOLAR: ICD-10-CM

## 2020-06-26 PROCEDURE — 76642 ULTRASOUND BREAST LIMITED: CPT | Performed by: RADIOLOGY

## 2020-06-26 PROCEDURE — 77066 DX MAMMO INCL CAD BI: CPT

## 2020-06-26 PROCEDURE — G0279 TOMOSYNTHESIS, MAMMO: HCPCS

## 2020-06-26 PROCEDURE — 76642 ULTRASOUND BREAST LIMITED: CPT

## 2020-06-26 PROCEDURE — 77066 DX MAMMO INCL CAD BI: CPT | Performed by: RADIOLOGY

## 2020-06-26 PROCEDURE — 77062 BREAST TOMOSYNTHESIS BI: CPT | Performed by: RADIOLOGY

## 2021-09-23 PROCEDURE — U0004 COV-19 TEST NON-CDC HGH THRU: HCPCS | Performed by: FAMILY MEDICINE

## 2021-09-24 ENCOUNTER — TELEPHONE (OUTPATIENT)
Dept: URGENT CARE | Facility: CLINIC | Age: 36
End: 2021-09-24

## 2021-09-27 ENCOUNTER — TELEPHONE (OUTPATIENT)
Dept: URGENT CARE | Facility: CLINIC | Age: 36
End: 2021-09-27

## 2021-10-28 ENCOUNTER — OFFICE VISIT (OUTPATIENT)
Dept: FAMILY MEDICINE CLINIC | Facility: CLINIC | Age: 36
End: 2021-10-28

## 2021-10-28 VITALS
WEIGHT: 142 LBS | BODY MASS INDEX: 26.81 KG/M2 | SYSTOLIC BLOOD PRESSURE: 98 MMHG | HEART RATE: 73 BPM | TEMPERATURE: 97.2 F | HEIGHT: 61 IN | DIASTOLIC BLOOD PRESSURE: 52 MMHG | OXYGEN SATURATION: 99 %

## 2021-10-28 DIAGNOSIS — E89.41 SURGICAL MENOPAUSE, SYMPTOMATIC: Primary | ICD-10-CM

## 2021-10-28 PROCEDURE — 99203 OFFICE O/P NEW LOW 30 MIN: CPT | Performed by: PHYSICIAN ASSISTANT

## 2021-10-28 RX ORDER — ESTRADIOL 0.05 MG/D
1 FILM, EXTENDED RELEASE TRANSDERMAL 2 TIMES WEEKLY
Qty: 8 PATCH | Refills: 11 | Status: SHIPPED | OUTPATIENT
Start: 2021-10-28 | End: 2022-12-07 | Stop reason: DRUGHIGH

## 2021-10-28 NOTE — PROGRESS NOTES
Subjective   Charlee Mackay is a 36 y.o. female  Hypertension (cocnerned about possible high blood pressure, couple episodes of dizziness and migraine) and Menopause (ongoing menopause symptoms since hysterectomy, hot flashes, mood swings )      History of Present Illness  Patient is a 28-year-old female who presents today stating that she is having more problems with ongoing mood swings, hot flashes, vaginal dryness and feeling a sensation that her blood pressure is going up and down since her hysterectomy in 2019.  She states that she was never started on any hormonal replacement therapy.  She also states that she has not followed up with her gynecologist that performed her hysterectomy.  The following portions of the patient's history were reviewed and updated as appropriate: allergies, current medications, past social history and problem list    Review of Systems   Respiratory: Negative.    Cardiovascular: Negative.    Endocrine: Positive for heat intolerance.   Genitourinary: Positive for vaginal pain.   Neurological: Positive for light-headedness.   Psychiatric/Behavioral: Positive for dysphoric mood and sleep disturbance.       Objective     Vitals:    10/28/21 1336   BP: 98/52   Pulse: 73   Temp: 97.2 °F (36.2 °C)   SpO2: 99%       Physical Exam  Vitals and nursing note reviewed.   Constitutional:       General: She is not in acute distress.     Appearance: Normal appearance. She is well-developed. She is not ill-appearing, toxic-appearing or diaphoretic.   HENT:      Head: Normocephalic and atraumatic.   Neck:      Vascular: No JVD.   Cardiovascular:      Rate and Rhythm: Normal rate and regular rhythm.      Heart sounds: Normal heart sounds. No murmur heard.      Pulmonary:      Effort: Pulmonary effort is normal. No respiratory distress.      Breath sounds: Normal breath sounds.   Chest:      Chest wall: No tenderness.   Abdominal:      General: There is no distension.      Palpations: Abdomen is soft.       Tenderness: There is no abdominal tenderness.   Skin:     General: Skin is warm and dry.      Coloration: Skin is not pale.      Findings: No erythema.   Neurological:      Mental Status: She is alert and oriented to person, place, and time.         Assessment/Plan     Diagnoses and all orders for this visit:    1. Surgical menopause, symptomatic (Primary)  -     Ambulatory Referral to Obstetrics / Gynecology    Other orders  -     estradiol (Vivelle-Dot) 0.05 MG/24HR patch; Place 1 patch on the skin as directed by provider 2 (Two) Times a Week.  Dispense: 8 patch; Refill: 11       Answers for HPI/ROS submitted by the patient on 10/21/2021  What is the primary reason for your visit?: High Blood Pressure    I discussed with patient that based on her chart notes, her gynecologist who performed hysterectomy, Dr. Escobar did in fact prescribe Vivelle-Dot for her at her last appointment.  I explained to her that on February 4, 2019 at her office visit he prescribed her Vivelle-Dot and had recommended that she follow-up with him.  Patient does not recall getting this medication filled.  Therefore, I explained to her that due to her ongoing symptoms, her young age being status post hysterectomy BSO she will need to get on hormones and that I am going to send a prescription that her gynecologist instructed her to take following her hysterectomy and I scheduled her follow-up with him as well.  I also reassured her that her blood pressure is normal today.    Part of this note may be an electronic transcription/translation of spoken language to printed text using the Dragon Dictation System.

## 2021-11-02 PROCEDURE — U0004 COV-19 TEST NON-CDC HGH THRU: HCPCS | Performed by: PHYSICIAN ASSISTANT

## 2021-11-11 ENCOUNTER — OFFICE VISIT (OUTPATIENT)
Dept: FAMILY MEDICINE CLINIC | Facility: CLINIC | Age: 36
End: 2021-11-11

## 2021-11-11 VITALS
TEMPERATURE: 97.2 F | HEIGHT: 61 IN | SYSTOLIC BLOOD PRESSURE: 110 MMHG | DIASTOLIC BLOOD PRESSURE: 66 MMHG | WEIGHT: 140 LBS | OXYGEN SATURATION: 100 % | BODY MASS INDEX: 26.43 KG/M2 | HEART RATE: 62 BPM

## 2021-11-11 DIAGNOSIS — E89.41 SURGICAL MENOPAUSE, SYMPTOMATIC: ICD-10-CM

## 2021-11-11 DIAGNOSIS — Z11.59 ENCOUNTER FOR HEPATITIS C SCREENING TEST FOR LOW RISK PATIENT: ICD-10-CM

## 2021-11-11 DIAGNOSIS — Z00.00 GENERAL MEDICAL EXAM: Primary | ICD-10-CM

## 2021-11-11 PROCEDURE — 3008F BODY MASS INDEX DOCD: CPT | Performed by: PHYSICIAN ASSISTANT

## 2021-11-11 PROCEDURE — 99395 PREV VISIT EST AGE 18-39: CPT | Performed by: PHYSICIAN ASSISTANT

## 2021-11-11 PROCEDURE — 2014F MENTAL STATUS ASSESS: CPT | Performed by: PHYSICIAN ASSISTANT

## 2021-11-11 NOTE — PROGRESS NOTES
Subjective   Charlee Mackay is a 36 y.o. female  Annual Exam (Annual Physical and labs )      History of Present Illness  Patient presents today for a preventive medical visit.  Patient is here to determine screening labs and tests that are due and to determine immunization status as well.  Patient will be counseled regarding preventative medicine issues such as regular exercise and  healthy diet as well.  She is scheduled to see her gynecologist later this month.  She says she is doing much better after starting on her estradiol patch, see previous office notes.  Night sweats have improved markedly, mood is improved.  The following portions of the patient's history were reviewed and updated as appropriate: allergies, current medications, past social history and problem list    Review of Systems   Constitutional: Negative.    HENT: Negative.    Eyes: Negative.    Respiratory: Negative.    Cardiovascular: Negative.    Gastrointestinal: Negative.    Endocrine: Positive for heat intolerance ( Much improved).   Genitourinary: Negative.    Musculoskeletal: Negative.    Skin: Negative.    Allergic/Immunologic: Negative.    Neurological: Negative.    Hematological: Negative.    Psychiatric/Behavioral: Negative.         Sleeping much better   All other systems reviewed and are negative.      Objective     Vitals:    11/11/21 1435   BP: 110/66   Pulse: 62   Temp: 97.2 °F (36.2 °C)   SpO2: 100%       Physical Exam  Vitals and nursing note reviewed.   Constitutional:       General: She is not in acute distress.     Appearance: Normal appearance. She is well-developed and normal weight. She is not ill-appearing, toxic-appearing or diaphoretic.   HENT:      Head: Normocephalic and atraumatic.      Right Ear: External ear normal.      Left Ear: External ear normal.      Nose: Nose normal.   Eyes:      Conjunctiva/sclera: Conjunctivae normal.      Pupils: Pupils are equal, round, and reactive to light.   Neck:      Thyroid: No  thyromegaly.      Vascular: No carotid bruit or JVD.   Cardiovascular:      Rate and Rhythm: Normal rate and regular rhythm.      Heart sounds: Normal heart sounds. No murmur heard.      Pulmonary:      Effort: Pulmonary effort is normal.      Breath sounds: Normal breath sounds.   Abdominal:      General: Bowel sounds are normal.      Palpations: Abdomen is soft. There is no mass.      Tenderness: There is no abdominal tenderness.   Musculoskeletal:         General: Normal range of motion.      Cervical back: Normal range of motion and neck supple.   Lymphadenopathy:      Cervical: No cervical adenopathy.   Skin:     General: Skin is warm and dry.      Coloration: Skin is not pale.      Findings: No erythema or rash.   Neurological:      Mental Status: She is alert and oriented to person, place, and time.      Cranial Nerves: No cranial nerve deficit.      Deep Tendon Reflexes: Reflexes are normal and symmetric.   Psychiatric:         Mood and Affect: Mood normal.         Behavior: Behavior normal.         Thought Content: Thought content normal.         Judgment: Judgment normal.       Discussed preventative medicine issues with patient including regular exercise, healthy diet, stress reduction, adequate sleep and recommended age-appropriate screening studies.  Assessment/Plan     Diagnoses and all orders for this visit:    1. General medical exam (Primary)  -     Hepatitis C Antibody; Future  -     CBC (No Diff); Future  -     Basic metabolic panel; Future  -     Lipid Panel; Future  -     TSH; Future    2. Encounter for hepatitis C screening test for low risk patient  -     Hepatitis C Antibody; Future    3. Surgical menopause, symptomatic    Continue on current dosage of estrogen patch at this time follow-up with gynecology as scheduled for later this month    Part of this note may be an electronic transcription/translation of spoken language to printed text using the Dragon Dictation System.

## 2021-12-03 PROCEDURE — U0004 COV-19 TEST NON-CDC HGH THRU: HCPCS | Performed by: FAMILY MEDICINE

## 2022-12-07 ENCOUNTER — OFFICE VISIT (OUTPATIENT)
Dept: FAMILY MEDICINE CLINIC | Facility: CLINIC | Age: 37
End: 2022-12-07

## 2022-12-07 VITALS
OXYGEN SATURATION: 99 % | TEMPERATURE: 97 F | HEART RATE: 76 BPM | BODY MASS INDEX: 29.27 KG/M2 | WEIGHT: 155 LBS | DIASTOLIC BLOOD PRESSURE: 64 MMHG | SYSTOLIC BLOOD PRESSURE: 128 MMHG | HEIGHT: 61 IN

## 2022-12-07 DIAGNOSIS — Z00.00 GENERAL MEDICAL EXAM: Primary | ICD-10-CM

## 2022-12-07 DIAGNOSIS — Z11.3 SCREENING FOR STDS (SEXUALLY TRANSMITTED DISEASES): ICD-10-CM

## 2022-12-07 DIAGNOSIS — N95.1 MENOPAUSAL AND FEMALE CLIMACTERIC STATES: ICD-10-CM

## 2022-12-07 PROCEDURE — 3008F BODY MASS INDEX DOCD: CPT | Performed by: PHYSICIAN ASSISTANT

## 2022-12-07 PROCEDURE — 99395 PREV VISIT EST AGE 18-39: CPT | Performed by: PHYSICIAN ASSISTANT

## 2022-12-07 PROCEDURE — 2014F MENTAL STATUS ASSESS: CPT | Performed by: PHYSICIAN ASSISTANT

## 2022-12-07 RX ORDER — ESTRADIOL 0.1 MG/D
1 FILM, EXTENDED RELEASE TRANSDERMAL 2 TIMES WEEKLY
Qty: 8 PATCH | Refills: 11 | Status: SHIPPED | OUTPATIENT
Start: 2022-12-08 | End: 2023-01-11 | Stop reason: ALTCHOICE

## 2022-12-07 NOTE — PROGRESS NOTES
Subjective   Charlee Mackay is a 37 y.o. female  Annual Exam (Annual physical, no pap, pt had hysterectomy, req full STD panel ) and HRT Therapy (Current dosing of estradiol patches not helping, having sweats and mood swings)      History of Present Illness     The patient presents today for an annual physical. She reports that she is still experiencing intermittent hot flashes and mood changes since her last visit. The patient notes she intermittently wakes up soaked from sweat due to the hot flashes. She also notes that she is not sleeping well.     The patient reports that she went to her urgent treatment center last year to be evaluated for a urinary tract infection and was told that she may have herpes in her mouth. She is requesting to be tested for sexually transmitted diseases, including genital herpes. She denies having any vaginal sores.     The patient notes a history of hysterectomy and states that since the surgery, she has been having less frequent bowel movements. She states her bowel movements have been more hard than usual and she only goes about 2 times weekly. The patient also notes that when she does have a bowel movement, she does not expel all of the stool completely and needs to return to the restroom a few hours later to finish. The patient states that she is urinating well and denies any blood in her stool or urine.    The patient denies any family history of breast cancer or colon cancer. She denies any history of anemia. She denies any sinus issues or ear issues.  The patient denies any musculoskeletal issues. She denies any rashes or moles that are changing. The patient notes she did eat before her appointment today.    Patient presents today for a preventive medical visit.  Patient is here to determine screening labs and tests that are due and to determine immunization status as well.  Patient will be counseled regarding preventative medicine issues such as regular exercise and healthy  diet as well.    The following portions of the patient's history were reviewed and updated as appropriate: allergies, current medications, past social history and problem list    Review of Systems   Constitutional: Negative.    HENT: Negative.    Eyes: Negative.    Respiratory: Negative.    Cardiovascular: Negative.    Gastrointestinal: Negative.    Endocrine: Positive for heat intolerance.   Genitourinary: Negative.    Musculoskeletal: Negative.    Skin: Negative.    Allergic/Immunologic: Negative.    Neurological: Negative.    Hematological: Negative.    Psychiatric/Behavioral: Positive for dysphoric mood and sleep disturbance.   All other systems reviewed and are negative.      Objective     Vitals:    12/07/22 1037   BP: 128/64   Pulse: 76   Temp: 97 °F (36.1 °C)   SpO2: 99%       Physical Exam  Vitals and nursing note reviewed.   Constitutional:       General: She is not in acute distress.     Appearance: Normal appearance. She is well-developed and normal weight. She is not ill-appearing, toxic-appearing or diaphoretic.   HENT:      Head: Normocephalic and atraumatic.      Right Ear: External ear normal.      Left Ear: External ear normal.   Eyes:      Conjunctiva/sclera: Conjunctivae normal.      Pupils: Pupils are equal, round, and reactive to light.   Neck:      Thyroid: No thyromegaly.      Vascular: No carotid bruit or JVD.   Cardiovascular:      Rate and Rhythm: Normal rate and regular rhythm.      Pulses: Normal pulses.      Heart sounds: Normal heart sounds. No murmur heard.  Pulmonary:      Effort: Pulmonary effort is normal. No respiratory distress.      Breath sounds: Normal breath sounds.   Abdominal:      General: Bowel sounds are normal.      Palpations: Abdomen is soft. There is no mass.      Tenderness: There is no abdominal tenderness.   Musculoskeletal:         General: No swelling. Normal range of motion.      Cervical back: Normal range of motion and neck supple.   Lymphadenopathy:       Cervical: No cervical adenopathy.   Skin:     General: Skin is warm and dry.      Findings: No lesion or rash.   Neurological:      Mental Status: She is alert and oriented to person, place, and time.      Cranial Nerves: No cranial nerve deficit.      Sensory: No sensory deficit.      Motor: No weakness.      Coordination: Coordination normal.      Gait: Gait normal.      Deep Tendon Reflexes: Reflexes are normal and symmetric.   Psychiatric:         Mood and Affect: Mood normal.         Behavior: Behavior normal.         Thought Content: Thought content normal.         Judgment: Judgment normal.       Discussed preventative medicine issues with patient including regular exercise, healthy diet, stress reduction, adequate sleep and recommended age-appropriate screening studies.  Assessment & Plan     Diagnoses and all orders for this visit:    1. General medical exam (Primary)  -     HIV-1 / O / 2 Ag / Antibody 4th Generation; Future  -     Hepatitis C Antibody; Future  -     RPR; Future  -     Chlamydia trachomatis, Neisseria gonorrhoeae, PCR - Urine, Urine, Random Void; Future  -     Lipid Panel; Future  -     Basic metabolic panel; Future  -     CBC (No Diff); Future  -     HSV 1 & 2 - Specific Antibody, IgG; Future  -     TSH; Future    2. Screening for STDs (sexually transmitted diseases)  -     HIV-1 / O / 2 Ag / Antibody 4th Generation; Future  -     Hepatitis C Antibody; Future  -     RPR; Future  -     Chlamydia trachomatis, Neisseria gonorrhoeae, PCR - Urine, Urine, Random Void; Future  -     Lipid Panel; Future  -     Basic metabolic panel; Future  -     CBC (No Diff); Future  -     HSV 1 & 2 - Specific Antibody, IgG; Future    3. Menopausal and female climacteric states  -     Estradiol; Future    Other orders  -     estradiol (Vivelle-Dot) 0.1 MG/24HR patch; Place 1 patch on the skin as directed by provider 2 (Two) Times a Week. New dose  Dispense: 8 patch; Refill: 11       The patient's hormone  replacement medication, estradiol, will be increased to 1 mg. Lab tests will be ordered today, including hormone levels, thyroid panel, and a full STD panel. I will send a letter to patient with my detailed interpretation of patient's labs after I myself have received and reviewed the above ordered labs. The patient was counseled that her bowel symptoms are most likely due to a change in her colon path status post hysterectomy. She was advised to take MiraLAX 0.25 cups daily which should improve her bowel symptoms.      Transcribed from ambient dictation for Jackie Finch PA-C by Zeynep Conner.  12/07/22   13:18 EST    Patient or patient representative verbalized consent to the visit recording.  I have personally performed the services described in this document as transcribed by the above individual, and it is both accurate and complete.  Jackie Finch PA-C  12/7/2022  15:59 EST

## 2022-12-08 ENCOUNTER — LAB (OUTPATIENT)
Dept: LAB | Facility: HOSPITAL | Age: 37
End: 2022-12-08

## 2022-12-08 DIAGNOSIS — Z11.3 SCREENING FOR STDS (SEXUALLY TRANSMITTED DISEASES): ICD-10-CM

## 2022-12-08 DIAGNOSIS — Z00.00 GENERAL MEDICAL EXAM: ICD-10-CM

## 2022-12-08 DIAGNOSIS — N95.1 MENOPAUSAL AND FEMALE CLIMACTERIC STATES: ICD-10-CM

## 2022-12-08 LAB
ANION GAP SERPL CALCULATED.3IONS-SCNC: 10 MMOL/L (ref 5–15)
BUN SERPL-MCNC: 13 MG/DL (ref 6–20)
BUN/CREAT SERPL: 14.4 (ref 7–25)
CALCIUM SPEC-SCNC: 9.2 MG/DL (ref 8.6–10.5)
CHLORIDE SERPL-SCNC: 106 MMOL/L (ref 98–107)
CHOLEST SERPL-MCNC: 264 MG/DL (ref 0–200)
CO2 SERPL-SCNC: 27 MMOL/L (ref 22–29)
CREAT SERPL-MCNC: 0.9 MG/DL (ref 0.57–1)
DEPRECATED RDW RBC AUTO: 39.2 FL (ref 37–54)
EGFRCR SERPLBLD CKD-EPI 2021: 83.6 ML/MIN/1.73
ERYTHROCYTE [DISTWIDTH] IN BLOOD BY AUTOMATED COUNT: 11.9 % (ref 12.3–15.4)
GLUCOSE SERPL-MCNC: 76 MG/DL (ref 65–99)
HCT VFR BLD AUTO: 39.5 % (ref 34–46.6)
HCV AB SER DONR QL: NORMAL
HDLC SERPL-MCNC: 89 MG/DL (ref 40–60)
HGB BLD-MCNC: 13.1 G/DL (ref 12–15.9)
HIV1+2 AB SER QL: NORMAL
LDLC SERPL CALC-MCNC: 154 MG/DL (ref 0–100)
LDLC/HDLC SERPL: 1.7 {RATIO}
MCH RBC QN AUTO: 30 PG (ref 26.6–33)
MCHC RBC AUTO-ENTMCNC: 33.2 G/DL (ref 31.5–35.7)
MCV RBC AUTO: 90.4 FL (ref 79–97)
PLATELET # BLD AUTO: 237 10*3/MM3 (ref 140–450)
PMV BLD AUTO: 12 FL (ref 6–12)
POTASSIUM SERPL-SCNC: 3.6 MMOL/L (ref 3.5–5.2)
RBC # BLD AUTO: 4.37 10*6/MM3 (ref 3.77–5.28)
SODIUM SERPL-SCNC: 143 MMOL/L (ref 136–145)
TRIGL SERPL-MCNC: 120 MG/DL (ref 0–150)
TSH SERPL DL<=0.05 MIU/L-ACNC: 0.59 UIU/ML (ref 0.27–4.2)
VLDLC SERPL-MCNC: 21 MG/DL (ref 5–40)
WBC NRBC COR # BLD: 4.11 10*3/MM3 (ref 3.4–10.8)

## 2022-12-08 PROCEDURE — 36415 COLL VENOUS BLD VENIPUNCTURE: CPT

## 2022-12-08 PROCEDURE — 86695 HERPES SIMPLEX TYPE 1 TEST: CPT

## 2022-12-08 PROCEDURE — G0432 EIA HIV-1/HIV-2 SCREEN: HCPCS

## 2022-12-08 PROCEDURE — 85027 COMPLETE CBC AUTOMATED: CPT

## 2022-12-08 PROCEDURE — 86592 SYPHILIS TEST NON-TREP QUAL: CPT

## 2022-12-08 PROCEDURE — 86803 HEPATITIS C AB TEST: CPT

## 2022-12-08 PROCEDURE — 82670 ASSAY OF TOTAL ESTRADIOL: CPT

## 2022-12-08 PROCEDURE — 87591 N.GONORRHOEAE DNA AMP PROB: CPT

## 2022-12-08 PROCEDURE — 87491 CHLMYD TRACH DNA AMP PROBE: CPT

## 2022-12-08 PROCEDURE — 80061 LIPID PANEL: CPT

## 2022-12-08 PROCEDURE — 80048 BASIC METABOLIC PNL TOTAL CA: CPT

## 2022-12-08 PROCEDURE — 84443 ASSAY THYROID STIM HORMONE: CPT

## 2022-12-08 PROCEDURE — 86696 HERPES SIMPLEX TYPE 2 TEST: CPT

## 2022-12-09 LAB
ESTRADIOL SERPL HS-MCNC: 99.8 PG/ML
RPR SER QL: NORMAL

## 2022-12-10 LAB
HSV1 IGG SER IA-ACNC: 2.99 INDEX (ref 0–0.9)
HSV2 IGG SER IA-ACNC: <0.91 INDEX (ref 0–0.9)

## 2022-12-12 LAB
C TRACH RRNA SPEC QL NAA+PROBE: NEGATIVE
N GONORRHOEA RRNA SPEC QL NAA+PROBE: NEGATIVE

## 2023-01-03 ENCOUNTER — TELEPHONE (OUTPATIENT)
Dept: FAMILY MEDICINE CLINIC | Facility: CLINIC | Age: 38
End: 2023-01-03
Payer: COMMERCIAL

## 2023-01-03 NOTE — TELEPHONE ENCOUNTER
I would recommend getting her into a gynecologist see if she has a specific gynecologist that she wants her appointment with or if she would like for us to choose 1

## 2023-01-03 NOTE — TELEPHONE ENCOUNTER
----- Message from Charlee Mackay sent at 1/1/2023  8:07 PM EST -----  Regarding: Charlee Mackay   Contact: [phone]  Seun ms newsomen the medicine that I am taking for my estrogen. The patches are not staying on due to me sweating and when I take a shower, I’m still having real bad hot flashes. I was wondering if there are any pills that I can take I think that that will be better for me.

## 2023-01-11 RX ORDER — ESTRADIOL 1 MG/1
1 TABLET ORAL DAILY
Qty: 30 TABLET | Refills: 11 | Status: SHIPPED | OUTPATIENT
Start: 2023-01-11

## 2023-05-31 ENCOUNTER — OFFICE VISIT (OUTPATIENT)
Dept: FAMILY MEDICINE CLINIC | Facility: CLINIC | Age: 38
End: 2023-05-31

## 2023-05-31 ENCOUNTER — LAB (OUTPATIENT)
Dept: LAB | Facility: HOSPITAL | Age: 38
End: 2023-05-31

## 2023-05-31 VITALS
WEIGHT: 154.6 LBS | HEART RATE: 65 BPM | DIASTOLIC BLOOD PRESSURE: 68 MMHG | OXYGEN SATURATION: 99 % | HEIGHT: 61 IN | TEMPERATURE: 97 F | BODY MASS INDEX: 29.19 KG/M2 | SYSTOLIC BLOOD PRESSURE: 124 MMHG

## 2023-05-31 DIAGNOSIS — N95.1 MENOPAUSAL HOT FLUSHES: Primary | ICD-10-CM

## 2023-05-31 DIAGNOSIS — R61 NIGHT SWEATS: ICD-10-CM

## 2023-05-31 DIAGNOSIS — N95.1 MENOPAUSAL HOT FLUSHES: ICD-10-CM

## 2023-05-31 DIAGNOSIS — R68.82 LOW LIBIDO: ICD-10-CM

## 2023-05-31 LAB
DEPRECATED RDW RBC AUTO: 42.7 FL (ref 37–54)
ERYTHROCYTE [DISTWIDTH] IN BLOOD BY AUTOMATED COUNT: 13 % (ref 12.3–15.4)
ESTRADIOL SERPL HS-MCNC: <5 PG/ML
HCT VFR BLD AUTO: 37.4 % (ref 34–46.6)
HGB BLD-MCNC: 12.6 G/DL (ref 12–15.9)
MCH RBC QN AUTO: 30.2 PG (ref 26.6–33)
MCHC RBC AUTO-ENTMCNC: 33.7 G/DL (ref 31.5–35.7)
MCV RBC AUTO: 89.7 FL (ref 79–97)
PLATELET # BLD AUTO: 219 10*3/MM3 (ref 140–450)
PMV BLD AUTO: 11.9 FL (ref 6–12)
PROGEST SERPL-MCNC: 0.2 NG/ML
RBC # BLD AUTO: 4.17 10*6/MM3 (ref 3.77–5.28)
T4 FREE SERPL-MCNC: 1.03 NG/DL (ref 0.93–1.7)
TESTOST SERPL-MCNC: 8.76 NG/DL (ref 8.4–48.1)
TSH SERPL DL<=0.05 MIU/L-ACNC: 0.91 UIU/ML (ref 0.27–4.2)
WBC NRBC COR # BLD: 4.46 10*3/MM3 (ref 3.4–10.8)

## 2023-05-31 PROCEDURE — 36415 COLL VENOUS BLD VENIPUNCTURE: CPT

## 2023-05-31 PROCEDURE — 84439 ASSAY OF FREE THYROXINE: CPT

## 2023-05-31 PROCEDURE — 85027 COMPLETE CBC AUTOMATED: CPT

## 2023-05-31 PROCEDURE — 84443 ASSAY THYROID STIM HORMONE: CPT

## 2023-05-31 PROCEDURE — 1160F RVW MEDS BY RX/DR IN RCRD: CPT | Performed by: PHYSICIAN ASSISTANT

## 2023-05-31 PROCEDURE — 84403 ASSAY OF TOTAL TESTOSTERONE: CPT

## 2023-05-31 PROCEDURE — 84144 ASSAY OF PROGESTERONE: CPT

## 2023-05-31 PROCEDURE — 82670 ASSAY OF TOTAL ESTRADIOL: CPT

## 2023-05-31 PROCEDURE — 99213 OFFICE O/P EST LOW 20 MIN: CPT | Performed by: PHYSICIAN ASSISTANT

## 2023-05-31 PROCEDURE — 1159F MED LIST DOCD IN RCRD: CPT | Performed by: PHYSICIAN ASSISTANT

## 2023-05-31 NOTE — PROGRESS NOTES
"Subjective   Charlee Mackay is a 37 y.o. female   Low libido (Discuss options for Low libido)      History of Present Illness   CHARLEE MACKAY ( 1985) is seen today to discuss low libido. She is accompanied by her daughter.    The patient had a total hysterectomy in  due to endometriosis, which improved her pelvic pain. She has a low libido, but she denies any pelvic pain when she has intercourse. At the last visit, the patient's estrogen was increased, but she is still dry, and she is still experiencing severe night sweats. She needs to sleep with a fan. She has tried the patches, but she \"sweats them off.\"    The following portions of the patient's history were reviewed and updated as appropriate: allergies, current medications, past social history and problem list    Review of Systems   Endocrine: Positive for heat intolerance.   Genitourinary: Positive for vaginal pain ( vaginal dryness). Negative for pelvic pain and vaginal discharge.        Low libido   Psychiatric/Behavioral: Positive for sleep disturbance.       Objective     Vitals:    23 1354   BP: 124/68   Pulse: 65   Temp: 97 °F (36.1 °C)   SpO2: 99%       Physical Exam  Vitals and nursing note reviewed.   Constitutional:       General: She is not in acute distress.     Appearance: Normal appearance. She is well-developed. She is not ill-appearing, toxic-appearing or diaphoretic.   HENT:      Head: Normocephalic and atraumatic.   Eyes:      Conjunctiva/sclera: Conjunctivae normal.   Pulmonary:      Effort: Pulmonary effort is normal. No respiratory distress.   Skin:     General: Skin is dry.      Coloration: Skin is not pale.      Findings: No erythema or rash.   Neurological:      Mental Status: She is alert and oriented to person, place, and time.      Coordination: Coordination normal.   Psychiatric:         Attention and Perception: She is attentive.         Mood and Affect: Mood normal.         Speech: Speech normal.         " Behavior: Behavior normal.         Thought Content: Thought content normal.         Judgment: Judgment normal.         Assessment & Plan     Diagnoses and all orders for this visit:    1. Menopausal hot flushes (Primary)  -     Estradiol; Future  -     Progesterone; Future  -     Testosterone; Future  -     TSH; Future  -     T4, Free; Future  -     CBC (No Diff); Future    2. Night sweats  -     Estradiol; Future  -     Progesterone; Future  -     Testosterone; Future  -     TSH; Future  -     T4, Free; Future  -     CBC (No Diff); Future    3. Low libido    1. Low libido  - We will check her testosterone levels, estrogen level, and progesterone levels.  - Once I get that, we need to come up with a plan on how to replenish what needs replenishing.  - She will continue taking the estrogen that she is presently on.    Transcribed from ambient dictation for Jackie Finch PA-C by Felicia Ross.  05/31/23   16:11 EDT    Patient or patient representative verbalized consent to the visit recording.  I have personally performed the services described in this document as transcribed by the above individual, and it is both accurate and complete.  Jackie Finch PA-C  5/31/2023  16:56 EDT

## 2023-06-05 RX ORDER — ESTRADIOL 2 MG/1
2 TABLET ORAL DAILY
Qty: 30 TABLET | Refills: 11 | Status: SHIPPED | OUTPATIENT
Start: 2023-06-05

## 2023-07-27 ENCOUNTER — TELEPHONE (OUTPATIENT)
Dept: FAMILY MEDICINE CLINIC | Facility: CLINIC | Age: 38
End: 2023-07-27
Payer: COMMERCIAL

## 2023-07-27 NOTE — TELEPHONE ENCOUNTER
----- Message from Charlee Mackay sent at 7/26/2023  7:33 PM EDT -----  Regarding: Charlee Mackay   Contact: [phone]  [facility] about for about 3 days now I’ve been having a little itch and mild  discharge. It doesn’t smell but I believe that I am getting a yeast infection from my medication  that I am taking and I was wondering if you could prescribe me some medicine

## 2023-08-01 ENCOUNTER — OFFICE VISIT (OUTPATIENT)
Dept: FAMILY MEDICINE CLINIC | Facility: CLINIC | Age: 38
End: 2023-08-01
Payer: COMMERCIAL

## 2023-08-01 VITALS
DIASTOLIC BLOOD PRESSURE: 82 MMHG | HEART RATE: 71 BPM | HEIGHT: 61 IN | BODY MASS INDEX: 30.21 KG/M2 | OXYGEN SATURATION: 99 % | SYSTOLIC BLOOD PRESSURE: 128 MMHG | TEMPERATURE: 97.8 F | WEIGHT: 160 LBS

## 2023-08-01 DIAGNOSIS — Z12.31 BREAST CANCER SCREENING BY MAMMOGRAM: ICD-10-CM

## 2023-08-01 DIAGNOSIS — Z12.39 ENCOUNTER FOR BREAST CANCER SCREENING USING NON-MAMMOGRAM MODALITY: Primary | ICD-10-CM

## 2023-10-04 ENCOUNTER — HOSPITAL ENCOUNTER (OUTPATIENT)
Dept: MAMMOGRAPHY | Facility: HOSPITAL | Age: 38
Discharge: HOME | End: 2023-10-04
Admitting: PHYSICIAN ASSISTANT
Payer: COMMERCIAL

## 2023-10-04 DIAGNOSIS — Z12.31 BREAST CANCER SCREENING BY MAMMOGRAM: ICD-10-CM

## 2023-10-04 PROCEDURE — 77063 BREAST TOMOSYNTHESIS BI: CPT

## 2023-10-04 PROCEDURE — 77067 SCR MAMMO BI INCL CAD: CPT

## 2023-10-06 ENCOUNTER — LAB (OUTPATIENT)
Dept: LAB | Facility: HOSPITAL | Age: 38
End: 2023-10-06
Payer: COMMERCIAL

## 2023-10-06 ENCOUNTER — OFFICE VISIT (OUTPATIENT)
Dept: FAMILY MEDICINE CLINIC | Facility: CLINIC | Age: 38
End: 2023-10-06
Payer: COMMERCIAL

## 2023-10-06 VITALS
DIASTOLIC BLOOD PRESSURE: 88 MMHG | OXYGEN SATURATION: 99 % | TEMPERATURE: 98 F | HEIGHT: 61 IN | SYSTOLIC BLOOD PRESSURE: 142 MMHG | BODY MASS INDEX: 28.62 KG/M2 | HEART RATE: 68 BPM | RESPIRATION RATE: 18 BRPM | WEIGHT: 151.6 LBS

## 2023-10-06 DIAGNOSIS — R06.09 DOE (DYSPNEA ON EXERTION): ICD-10-CM

## 2023-10-06 DIAGNOSIS — R06.09 DOE (DYSPNEA ON EXERTION): Primary | ICD-10-CM

## 2023-10-06 DIAGNOSIS — R42 EPISODIC LIGHTHEADEDNESS: ICD-10-CM

## 2023-10-06 DIAGNOSIS — R03.0 ELEVATED BP WITHOUT DIAGNOSIS OF HYPERTENSION: ICD-10-CM

## 2023-10-06 DIAGNOSIS — R01.1 HEART MURMUR: ICD-10-CM

## 2023-10-06 DIAGNOSIS — R00.0 TACHYCARDIA: ICD-10-CM

## 2023-10-06 LAB
ALBUMIN SERPL-MCNC: 4.4 G/DL (ref 3.5–5.2)
ALBUMIN/GLOB SERPL: 1.4 G/DL
ALP SERPL-CCNC: 49 U/L (ref 39–117)
ALT SERPL W P-5'-P-CCNC: 6 U/L (ref 1–33)
ANION GAP SERPL CALCULATED.3IONS-SCNC: 10.9 MMOL/L (ref 5–15)
AST SERPL-CCNC: 19 U/L (ref 1–32)
BILIRUB SERPL-MCNC: 0.5 MG/DL (ref 0–1.2)
BUN SERPL-MCNC: 13 MG/DL (ref 6–20)
BUN/CREAT SERPL: 12.6 (ref 7–25)
CALCIUM SPEC-SCNC: 9.5 MG/DL (ref 8.6–10.5)
CHLORIDE SERPL-SCNC: 104 MMOL/L (ref 98–107)
CHOLEST SERPL-MCNC: 258 MG/DL (ref 0–200)
CO2 SERPL-SCNC: 26.1 MMOL/L (ref 22–29)
CREAT SERPL-MCNC: 1.03 MG/DL (ref 0.57–1)
DEPRECATED RDW RBC AUTO: 38.5 FL (ref 37–54)
EGFRCR SERPLBLD CKD-EPI 2021: 70.6 ML/MIN/1.73
ERYTHROCYTE [DISTWIDTH] IN BLOOD BY AUTOMATED COUNT: 12 % (ref 12.3–15.4)
GLOBULIN UR ELPH-MCNC: 3.2 GM/DL
GLUCOSE SERPL-MCNC: 91 MG/DL (ref 65–99)
HCT VFR BLD AUTO: 42.1 % (ref 34–46.6)
HDLC SERPL-MCNC: 101 MG/DL (ref 40–60)
HGB BLD-MCNC: 14.1 G/DL (ref 12–15.9)
LDLC SERPL CALC-MCNC: 141 MG/DL (ref 0–100)
LDLC/HDLC SERPL: 1.37 {RATIO}
MAGNESIUM SERPL-MCNC: 2 MG/DL (ref 1.6–2.6)
MCH RBC QN AUTO: 29.4 PG (ref 26.6–33)
MCHC RBC AUTO-ENTMCNC: 33.5 G/DL (ref 31.5–35.7)
MCV RBC AUTO: 87.9 FL (ref 79–97)
PLATELET # BLD AUTO: 214 10*3/MM3 (ref 140–450)
PMV BLD AUTO: 12.4 FL (ref 6–12)
POTASSIUM SERPL-SCNC: 4 MMOL/L (ref 3.5–5.2)
PROT SERPL-MCNC: 7.6 G/DL (ref 6–8.5)
RBC # BLD AUTO: 4.79 10*6/MM3 (ref 3.77–5.28)
SODIUM SERPL-SCNC: 141 MMOL/L (ref 136–145)
T4 FREE SERPL-MCNC: 1 NG/DL (ref 0.93–1.7)
TRIGL SERPL-MCNC: 94 MG/DL (ref 0–150)
TSH SERPL DL<=0.05 MIU/L-ACNC: 0.76 UIU/ML (ref 0.27–4.2)
VLDLC SERPL-MCNC: 16 MG/DL (ref 5–40)
WBC NRBC COR # BLD: 4.05 10*3/MM3 (ref 3.4–10.8)

## 2023-10-06 PROCEDURE — 83735 ASSAY OF MAGNESIUM: CPT

## 2023-10-06 PROCEDURE — 80061 LIPID PANEL: CPT

## 2023-10-06 PROCEDURE — 80050 GENERAL HEALTH PANEL: CPT

## 2023-10-06 PROCEDURE — 36415 COLL VENOUS BLD VENIPUNCTURE: CPT

## 2023-10-06 PROCEDURE — 84439 ASSAY OF FREE THYROXINE: CPT

## 2023-10-06 PROCEDURE — 99214 OFFICE O/P EST MOD 30 MIN: CPT | Performed by: PHYSICIAN ASSISTANT

## 2023-10-06 NOTE — PROGRESS NOTES
Subjective   Charlee Mackay is a 38 y.o. female  Dizziness, Hypertension, Headache, and Rapid Heart Rate      History of Present Illness    Charlee Mackay, date of birth 1985, presents today to discuss episodes of high blood pressure when this occurs, she has a headache, dizziness, and rapid heart rate.    The patient reports these episodes occur approximately twice a week. It is hard for her to keep up with the episodes. She denies any known triggers. She started noticing the episodes approximately 3 months ago, but she did not pay attention to it. She conveys it starts with blurry vision and photophobia. Her migraines begins and then she has to lie down. Her heart also begins to pump very fast. She does not have a device that objectively monitors her heart rate and sets off if it is high. She admits when she climbs up and down the stairs, her heart races fast and she has to sit down for 1 minute. She gets short of breath and lightheaded sometimes but she denies having any episode of syncope. She has a heart murmur since childhood. She had an echocardiogram that checked her valves when she was 12 years old. She was never told whether she will need a valve replacement.     She started checking her blood pressure last week. She had a reading of 170/101 mmHg at 1 time. She has lost weight compared to last time. She denies any renal issues, and no edema. She has been taking Excedrin for her migraines which helps. She has not seen a cardiologist since adulthood. She has stopped having periods because she had an hysterectomy. She denies gastrointestinal bleeding.     The following portions of the patient's history were reviewed and updated as appropriate: allergies, current medications, past social history and problem list    Review of Systems   Constitutional:  Negative for activity change, appetite change, diaphoresis and unexpected weight change.   HENT:  Negative for postnasal drip and sinus pressure.    Eyes:   Positive for photophobia.   Respiratory:  Positive for shortness of breath. Negative for cough, choking, chest tightness and wheezing.    Cardiovascular:  Positive for palpitations. Negative for chest pain and leg swelling.   Gastrointestinal: Negative.    Genitourinary: Negative.    Neurological:  Positive for light-headedness and headaches.     Objective     Vitals:    10/06/23 0858   BP: 142/88   Pulse: 68   Resp: 18   Temp: 98 °F (36.7 °C)   SpO2: 99%       Physical Exam  Vitals and nursing note reviewed.   Constitutional:       General: She is not in acute distress.     Appearance: Normal appearance. She is well-developed. She is not ill-appearing, toxic-appearing or diaphoretic.   HENT:      Head: Normocephalic and atraumatic.   Eyes:      Conjunctiva/sclera: Conjunctivae normal.      Pupils: Pupils are equal, round, and reactive to light.   Cardiovascular:      Rate and Rhythm: Normal rate and regular rhythm.      Heart sounds: Murmur heard.    with a grade of 3/6.   Pulmonary:      Effort: Pulmonary effort is normal.   Musculoskeletal:      Cervical back: Normal range of motion and neck supple. No rigidity.      Right lower leg: No edema.      Left lower leg: No edema.   Neurological:      Mental Status: She is alert and oriented to person, place, and time.      Cranial Nerves: No cranial nerve deficit.      Sensory: No sensory deficit.      Motor: No weakness.      Coordination: Coordination normal.   Psychiatric:         Mood and Affect: Mood normal.         Speech: Speech normal.         Behavior: Behavior normal.         Thought Content: Thought content normal.         Judgment: Judgment normal.       Assessment & Plan     Diagnoses and all orders for this visit:    1. CARPENTER (dyspnea on exertion) (Primary)  -     Ambulatory Referral to Cardiology  -     CBC (No Diff); Future  -     Comprehensive metabolic panel; Future  -     Magnesium; Future  -     TSH; Future  -     T4, Free; Future  -     Lipid Panel;  Future    2. Heart murmur  -     Ambulatory Referral to Cardiology    3. Episodic lightheadedness  -     Ambulatory Referral to Cardiology  -     CBC (No Diff); Future  -     Comprehensive metabolic panel; Future  -     Magnesium; Future  -     TSH; Future  -     T4, Free; Future  -     Lipid Panel; Future    4. Tachycardia  -     Ambulatory Referral to Cardiology  -     CBC (No Diff); Future  -     Comprehensive metabolic panel; Future  -     Magnesium; Future  -     TSH; Future  -     T4, Free; Future  -     Lipid Panel; Future    5. Elevated BP without diagnosis of hypertension  -     Ambulatory Referral to Cardiology  -     CBC (No Diff); Future  -     Comprehensive metabolic panel; Future  -     Magnesium; Future  -     TSH; Future  -     T4, Free; Future  -     Lipid Panel; Future     1. Dyspnea on exertion  - Routine lab tests were ordered today and I put in an urgent referral to cardiology. She was advised to avoid any strenuous activity at this time. She should hold on to the rails when climbing up and down the stairs. She is also to avoid carrying kids, dogs, or laundry baskets. She is to check her blood pressure anytime she feels unwell and check her watch to note her heart rate anytime she begins to have an episode.    Transcribed from ambient dictation for Jackie Finch PA-C by Alfredo Gallo.  10/06/23   10:38 EDT    Patient or patient representative verbalized consent to the visit recording.  I have personally performed the services described in this document as transcribed by the above individual, and it is both accurate and complete.

## 2023-10-18 ENCOUNTER — OFFICE VISIT (OUTPATIENT)
Dept: CARDIOLOGY | Facility: HOSPITAL | Age: 38
End: 2023-10-18
Payer: COMMERCIAL

## 2023-10-18 ENCOUNTER — HOSPITAL ENCOUNTER (OUTPATIENT)
Dept: CARDIOLOGY | Facility: HOSPITAL | Age: 38
Discharge: HOME | End: 2023-10-18
Payer: COMMERCIAL

## 2023-10-18 VITALS
HEIGHT: 61 IN | RESPIRATION RATE: 16 BRPM | DIASTOLIC BLOOD PRESSURE: 82 MMHG | TEMPERATURE: 97.6 F | OXYGEN SATURATION: 100 % | BODY MASS INDEX: 29.5 KG/M2 | HEART RATE: 60 BPM | SYSTOLIC BLOOD PRESSURE: 132 MMHG | WEIGHT: 156.25 LBS

## 2023-10-18 DIAGNOSIS — R06.09 DOE (DYSPNEA ON EXERTION): ICD-10-CM

## 2023-10-18 DIAGNOSIS — R00.0 RACING HEART BEAT: ICD-10-CM

## 2023-10-18 DIAGNOSIS — R00.2 PALPITATIONS: ICD-10-CM

## 2023-10-18 DIAGNOSIS — R42 DIZZINESS: ICD-10-CM

## 2023-10-18 DIAGNOSIS — R42 DIZZINESS: Primary | ICD-10-CM

## 2023-10-18 PROCEDURE — 93246 EXT ECG>7D<15D RECORDING: CPT

## 2023-10-18 NOTE — PROGRESS NOTES
[facility] Heart Monitor Documentation    Charlee Mackay  1985  [id number]  10/18/23      [] ZIO XT Patch  Model [id number] Prescribed for N/A Days    Serial Number: (N + 9 Digits) N   Apply-By Date on Box:   [employer]PS Tracking Number:   USPS Tracking        [] [employer] MINI PLUS Mobile Cardiac Telemetry  Model BGMINIPLUS Prescribed for N/A Days    Serial Number: (BGM + 7 Digits) BGM  Shipped-By Date on Box:   UPS Tracking Number: [id number]    UPS Tracking      [] [employer] MINI Holter Monitor  Model BGMINIEL Prescribed for 14 Days    Serial Number: (7 Digits) [id number]  Shipped-By Date on Box: [id number]  UPS Tracking Number: [id number]  UPS Tracking        This monitor was applied to the patient's chest and checked for proper functioning.  Ms. Charlee Mackay was instructed in the proper use of this monitor.  She was given the opportunity to ask questions and left the office with the device 's instruction manual.    Vida Bob MA, 14:29 EDT, 10/18/23                  [facility] 8[facility]

## 2023-10-18 NOTE — PROGRESS NOTES
"`````````````````````````````Chief Complaint  Establish Care, Heart Murmur, Dizziness, and Palpitations    Subjective    History of Present Illness {CC  Problem List  Visit  Diagnosis   Encounters  Notes  Medications  Labs  Result Review Imaging  Media :23}       History of Present Illness   38-year-old female presents the office today at the request of her primary care provider for ongoing evaluation of her dizziness and palpitations.  She reports she has been experiencing intermittent palpitations racing heart and dizziness for the past 3 months.  She reports dizziness is usually present when her blood pressure is elevated.  Recently checked her blood pressure and it was elevated in the 170s.  She reports she is not checking her blood pressure on a regular basis.  She does report experiencing dyspnea when climbing stairs.  She is following her heart rate on her Apple Watch and heart rates are 60s to 120s.  She often reports that her heart is racing and beating harder than normal.  She has a history of migraines and was diagnosed with a cardiac murmur as a child.  Father had a history of hypertension and heart failure and  at age 56.  Patient reports she is not sleeping well. Does not drink caffeine and hydrates well.  Objective     Vital Signs:   Vitals:    10/18/23 1402 10/18/23 1404 10/18/23 1406   BP: 132/84 147/92 132/82   BP Location: Right arm Left arm Left arm   Patient Position: Sitting Standing Sitting   Cuff Size: Adult Adult Adult   Pulse: 59 67 60   Resp:   16   Temp:   97.6 °F (36.4 °C)   TempSrc:   Temporal   SpO2: 99% 100% 100%   Weight:   70.9 kg (156 lb 4 oz)   Height:   154.9 cm (61\")     Body mass index is 29.52 kg/m².  Physical Exam  Vitals and nursing note reviewed.   Constitutional:       Appearance: Normal appearance.   HENT:      Head: Normocephalic.   Eyes:      Pupils: Pupils are equal, round, and reactive to light.   Cardiovascular:      Rate and Rhythm: Normal rate and " regular rhythm.      Pulses: Normal pulses.      Heart sounds: Normal heart sounds. No murmur heard.  Pulmonary:      Effort: Pulmonary effort is normal.      Breath sounds: Normal breath sounds.   Abdominal:      General: Bowel sounds are normal.      Palpations: Abdomen is soft.   Musculoskeletal:         General: Normal range of motion.      Cervical back: Normal range of motion.      Right lower leg: No edema.      Left lower leg: No edema.   Skin:     General: Skin is warm and dry.      Capillary Refill: Capillary refill takes less than 2 seconds.   Neurological:      Mental Status: She is alert and oriented to person, place, and time.   Psychiatric:         Mood and Affect: Mood normal.         Thought Content: Thought content normal.              Result Review  Data Reviewed:{ Labs  Result Review  Imaging  Med Tab  Media :23}   CBC (No Diff) (10/06/2023 10:04)  Comprehensive metabolic panel (10/06/2023 10:04)  Magnesium (10/06/2023 10:04)  TSH (10/06/2023 10:04)  T4, Free (10/06/2023 10:04)  Lipid Panel (10/06/2023 10:04)             Assessment and Plan {CC Problem List  Visit Diagnosis  ROS  Review (Popup)  Health Maintenance  Quality  BestPractice  Medications  SmartSets  SnapShot Encounters  Media :23}   1. Dizziness  Continue good hydration   - Holter Monitor - 72 Hour Up To 15 Days; Future  - Adult Transthoracic Echo Complete W/ Cont if Necessary Per Protocol; Future    2. Palpitations    - Holter Monitor - 72 Hour Up To 15 Days; Future  - Adult Transthoracic Echo Complete W/ Cont if Necessary Per Protocol; Future    3. Racing heart beat    - Holter Monitor - 72 Hour Up To 15 Days; Future  - Adult Transthoracic Echo Complete W/ Cont if Necessary Per Protocol; Future    4. CARPENTER (dyspnea on exertion)    - Adult Transthoracic Echo Complete W/ Cont if Necessary Per Protocol; Future          Follow Up {Instructions Charge Capture  Follow-up Communications :23}   Return in about 4 weeks  (around 11/15/2023) for Telemedicine visit, Monitor results.    Patient was given instructions and counseling regarding her condition or for health maintenance advice. Please see specific information pulled into the AVS if appropriate.  Patient was instructed to call the [facility] with any questions, concerns, or worsening symptoms.

## 2023-10-27 ENCOUNTER — HOSPITAL ENCOUNTER (OUTPATIENT)
Dept: CARDIOLOGY | Facility: HOSPITAL | Age: 38
Discharge: HOME | End: 2023-10-27
Payer: COMMERCIAL

## 2023-10-27 DIAGNOSIS — R42 DIZZINESS: ICD-10-CM

## 2023-10-27 DIAGNOSIS — R00.0 RACING HEART BEAT: ICD-10-CM

## 2023-10-27 DIAGNOSIS — R00.2 PALPITATIONS: ICD-10-CM

## 2023-10-27 DIAGNOSIS — R06.09 DOE (DYSPNEA ON EXERTION): ICD-10-CM

## 2023-10-27 LAB
BH CV ECHO MEAS - AO MAX PG: 6.9 MMHG
BH CV ECHO MEAS - AO MEAN PG: 4 MMHG
BH CV ECHO MEAS - AO ROOT DIAM: 2.9 CM
BH CV ECHO MEAS - AO V2 MAX: 131 CM/SEC
BH CV ECHO MEAS - AO V2 VTI: 29.5 CM
BH CV ECHO MEAS - AVA(I,D): 1.88 CM2
BH CV ECHO MEAS - EDV(CUBED): 131.2 ML
BH CV ECHO MEAS - EDV(MOD-SP2): 95 ML
BH CV ECHO MEAS - EDV(MOD-SP4): 111 ML
BH CV ECHO MEAS - EF(MOD-BP): 73.4 %
BH CV ECHO MEAS - EF(MOD-SP2): 76.9 %
BH CV ECHO MEAS - EF(MOD-SP4): 73 %
BH CV ECHO MEAS - ESV(CUBED): 9.4 ML
BH CV ECHO MEAS - ESV(MOD-SP2): 21.9 ML
BH CV ECHO MEAS - ESV(MOD-SP4): 30 ML
BH CV ECHO MEAS - FS: 58.4 %
BH CV ECHO MEAS - IVS/LVPW: 0.93 CM
BH CV ECHO MEAS - IVSD: 0.87 CM
BH CV ECHO MEAS - LA DIMENSION: 3.3 CM
BH CV ECHO MEAS - LAT PEAK E' VEL: 9.2 CM/SEC
BH CV ECHO MEAS - LV DIASTOLIC VOL/BSA (35-75): 65.3 CM2
BH CV ECHO MEAS - LV MASS(C)D: 162.7 GRAMS
BH CV ECHO MEAS - LV MAX PG: 3.1 MMHG
BH CV ECHO MEAS - LV MEAN PG: 1 MMHG
BH CV ECHO MEAS - LV SYSTOLIC VOL/BSA (12-30): 17.6 CM2
BH CV ECHO MEAS - LV V1 MAX: 88.1 CM/SEC
BH CV ECHO MEAS - LV V1 VTI: 17.4 CM
BH CV ECHO MEAS - LVIDD: 5.1 CM
BH CV ECHO MEAS - LVIDS: 2.11 CM
BH CV ECHO MEAS - LVOT AREA: 3.2 CM2
BH CV ECHO MEAS - LVOT DIAM: 2.01 CM
BH CV ECHO MEAS - LVPWD: 0.93 CM
BH CV ECHO MEAS - MED PEAK E' VEL: 8.4 CM/SEC
BH CV ECHO MEAS - MV A MAX VEL: 58.9 CM/SEC
BH CV ECHO MEAS - MV DEC SLOPE: 562.2 CM/SEC2
BH CV ECHO MEAS - MV DEC TIME: 0.25 SEC
BH CV ECHO MEAS - MV E MAX VEL: 120 CM/SEC
BH CV ECHO MEAS - MV E/A: 2.04
BH CV ECHO MEAS - MV MAX PG: 10 MMHG
BH CV ECHO MEAS - MV MEAN PG: 2.26 MMHG
BH CV ECHO MEAS - MV P1/2T: 75 MSEC
BH CV ECHO MEAS - MV V2 VTI: 45.7 CM
BH CV ECHO MEAS - MVA(P1/2T): 2.9 CM2
BH CV ECHO MEAS - MVA(VTI): 1.21 CM2
BH CV ECHO MEAS - PA ACC TIME: 0.14 SEC
BH CV ECHO MEAS - PI END-D VEL: 133 CM/SEC
BH CV ECHO MEAS - RAP SYSTOLE: 3 MMHG
BH CV ECHO MEAS - RVSP: 28 MMHG
BH CV ECHO MEAS - SI(MOD-SP2): 43 ML/M2
BH CV ECHO MEAS - SI(MOD-SP4): 47.7 ML/M2
BH CV ECHO MEAS - SV(LVOT): 55.4 ML
BH CV ECHO MEAS - SV(MOD-SP2): 73.1 ML
BH CV ECHO MEAS - SV(MOD-SP4): 81 ML
BH CV ECHO MEAS - TAPSE (>1.6): 1.75 CM
BH CV ECHO MEAS - TR MAX PG: 25 MMHG
BH CV ECHO MEAS - TR MAX VEL: 249.8 CM/SEC
BH CV ECHO MEASUREMENTS AVERAGE E/E' RATIO: 13.64
BH CV XLRA - RV BASE: 2.9 CM
BH CV XLRA - RV LENGTH: 7.4 CM
BH CV XLRA - RV MID: 2.46 CM
BH CV XLRA - TDI S': 10.9 CM/SEC
LEFT ATRIUM VOLUME INDEX: 17.6 ML/M2

## 2023-10-27 PROCEDURE — 93306 TTE W/DOPPLER COMPLETE: CPT

## 2023-10-30 NOTE — PROGRESS NOTES
Your heart is pumping efficiently and mitral valve prolapse was noted . Will discuss echo results at your follow up visit

## 2023-11-16 ENCOUNTER — TELEMEDICINE (OUTPATIENT)
Dept: CARDIOLOGY | Facility: HOSPITAL | Age: 38
End: 2023-11-16
Payer: COMMERCIAL

## 2023-11-16 VITALS — HEIGHT: 61 IN | BODY MASS INDEX: 29.45 KG/M2 | WEIGHT: 156 LBS | HEART RATE: 60 BPM

## 2023-11-16 DIAGNOSIS — R42 DIZZINESS: Primary | ICD-10-CM

## 2023-11-16 DIAGNOSIS — R00.0 RACING HEART BEAT: ICD-10-CM

## 2023-11-16 DIAGNOSIS — R06.09 DOE (DYSPNEA ON EXERTION): ICD-10-CM

## 2023-11-16 DIAGNOSIS — I49.3 PVC'S (PREMATURE VENTRICULAR CONTRACTIONS): ICD-10-CM

## 2023-11-16 RX ORDER — METOPROLOL SUCCINATE 25 MG/1
12.5 TABLET, EXTENDED RELEASE ORAL DAILY
Qty: 30 TABLET | Refills: 3 | Status: SHIPPED | OUTPATIENT
Start: 2023-11-16

## 2023-11-19 NOTE — PROGRESS NOTES
"Chief Complaint  No chief complaint on file.    Subjective    History of Present Illness {CC  Problem List  Visit  Diagnosis   Encounters  Notes  Medications  Labs  Result Review Imaging  Media :23}   You have chosen to receive care through the use of telemedicine. Telemedicine enables health care providers at different locations to provide safe, effective, and convenient care through the use of technology. As with any health care service, there are risks associated with the use of telemedicine, including equipment failure, poor connections, and  issues.    Do you understand the risks and benefits of telemedicine as I have explained them to you? Yes  Have your questions regarding telemedicine been answered? Yes  Do you consent to the use of telemedicine in your medical care today? Yes     History of Present Illness   38-year-old female presents for telehealth visit  for ongoing evaluation of her dizziness and palpitations.  She reports she has been experiencing intermittent palpitations racing heart and dizziness for the past 3 months.  She reports dizziness is usually present when her blood pressure is elevated.  Recently checked her blood pressure and it was elevated in the 170s.  She reports she is not checking her blood pressure on a regular basis.  She does report experiencing dyspnea when climbing stairs.  She is following her heart rate on her Apple Watch and heart rates are 60s to 120s.  She often reports that her heart is racing and beating harder than normal.  She has a history of migraines and was diagnosed with a cardiac murmur as a child.  Father had a history of hypertension and heart failure and  at age 56.  Patient reports she is not sleeping well. Does not drink caffeine and hydrates well.  Objective     Vital Signs:   Vitals:    23 1355   Pulse: 60   Weight: 70.8 kg (156 lb)   Height: 154.9 cm (61\")     Body mass index is 29.48 kg/m².  Physical Exam  Vitals " and nursing note reviewed.   Constitutional:       Appearance: Normal appearance.   HENT:      Head: Normocephalic.   Pulmonary:      Effort: Pulmonary effort is normal.   Neurological:      Mental Status: She is alert and oriented to person, place, and time.   Psychiatric:         Mood and Affect: Mood normal.         Behavior: Behavior normal.         Thought Content: Thought content normal.              Result Review  Data Reviewed:{ Labs  Result Review  Imaging  Med Tab  Media :23}   CBC (No Diff) (10/06/2023 10:04)  Comprehensive metabolic panel (10/06/2023 10:04)  Magnesium (10/06/2023 10:04)  TSH (10/06/2023 10:04)  T4, Free (10/06/2023 10:04)  Lipid Panel (10/06/2023 10:04)     Holter Monitor - 72 Hour Up To 15 Days (10/18/2023 14:33) avg hr 68 bpm PVC burden 7%  Adult Transthoracic Echo Complete W/ Cont if Necessary Per Protocol (10/27/2023 09:04)         Assessment and Plan {CC Problem List  Visit Diagnosis  ROS  Review (Popup)  Health Maintenance  Quality  BestPractice  Medications  SmartSets  SnapShot Encounters  Media :23}   1. Dizziness  Continue good hydration     2. pvcs  Begin toprol 12. 5mg daily     3. Racing heart beat  No arrhthymias noted on extended holter   4. CARPENTER (dyspnea on exertion)    Resolved           Follow Up {Instructions Charge Capture  Follow-up Communications :23}   Return in about 3 weeks (around 12/7/2023) for Telemedicine visit pvcs, mvp .    Patient was given instructions and counseling regarding her condition or for health maintenance advice. Please see specific information pulled into the AVS if appropriate.  Patient was instructed to call the [facility] with any questions, concerns, or worsening symptoms.

## 2024-04-04 ENCOUNTER — TELEPHONE (OUTPATIENT)
Dept: FAMILY MEDICINE CLINIC | Facility: CLINIC | Age: 39
End: 2024-04-04
Payer: COMMERCIAL

## 2024-04-04 NOTE — TELEPHONE ENCOUNTER
Patient needs to schedule an in person appointment for further evaluation and treatment and we can order the blood test at that time.

## 2024-04-04 NOTE — TELEPHONE ENCOUNTER
----- Message from Charlee Mackay sent at 4/4/2024  2:00 PM EDT -----  Regarding: Needing to know my blood type   Contact: [phone]  Hello I was wondering if you could tell me my blood type also I’m still having trouble going to the bathroom I’m needing something to regulate me so I can used the bathroom for

## 2024-06-10 ENCOUNTER — LAB (OUTPATIENT)
Dept: LAB | Facility: HOSPITAL | Age: 39
End: 2024-06-10
Payer: COMMERCIAL

## 2024-06-10 ENCOUNTER — OFFICE VISIT (OUTPATIENT)
Dept: FAMILY MEDICINE CLINIC | Facility: CLINIC | Age: 39
End: 2024-06-10
Payer: COMMERCIAL

## 2024-06-10 VITALS
BODY MASS INDEX: 28.7 KG/M2 | TEMPERATURE: 97.8 F | DIASTOLIC BLOOD PRESSURE: 88 MMHG | OXYGEN SATURATION: 99 % | SYSTOLIC BLOOD PRESSURE: 132 MMHG | WEIGHT: 152 LBS | HEART RATE: 67 BPM | HEIGHT: 61 IN

## 2024-06-10 DIAGNOSIS — Z01.83 BLOOD TYPING ENCOUNTER: ICD-10-CM

## 2024-06-10 DIAGNOSIS — Z12.31 BREAST CANCER SCREENING BY MAMMOGRAM: Primary | ICD-10-CM

## 2024-06-10 DIAGNOSIS — K59.09 CHRONIC CONSTIPATION: ICD-10-CM

## 2024-06-10 DIAGNOSIS — R51.9 INCREASED FREQUENCY OF HEADACHES: ICD-10-CM

## 2024-06-10 DIAGNOSIS — I10 PRIMARY HYPERTENSION: ICD-10-CM

## 2024-06-10 PROBLEM — Z01.419 WOMEN'S ANNUAL ROUTINE GYNECOLOGICAL EXAMINATION: Status: RESOLVED | Noted: 2019-02-04 | Resolved: 2024-06-10

## 2024-06-10 PROBLEM — N83.209 OVARIAN CYST: Status: RESOLVED | Noted: 2017-01-18 | Resolved: 2024-06-10

## 2024-06-10 PROBLEM — Z98.890 POSTOPERATIVE STATE: Status: RESOLVED | Noted: 2017-02-02 | Resolved: 2024-06-10

## 2024-06-10 LAB
ABO GROUP BLD: NORMAL
ALBUMIN SERPL-MCNC: 3.9 G/DL (ref 3.5–5.2)
ALBUMIN/GLOB SERPL: 1.4 G/DL
ALP SERPL-CCNC: 42 U/L (ref 39–117)
ALT SERPL W P-5'-P-CCNC: 5 U/L (ref 1–33)
ANION GAP SERPL CALCULATED.3IONS-SCNC: 9.2 MMOL/L (ref 5–15)
AST SERPL-CCNC: 21 U/L (ref 1–32)
BILIRUB SERPL-MCNC: 0.2 MG/DL (ref 0–1.2)
BUN SERPL-MCNC: 8 MG/DL (ref 6–20)
BUN/CREAT SERPL: 8.7 (ref 7–25)
CALCIUM SPEC-SCNC: 8.9 MG/DL (ref 8.6–10.5)
CHLORIDE SERPL-SCNC: 105 MMOL/L (ref 98–107)
CO2 SERPL-SCNC: 26.8 MMOL/L (ref 22–29)
CREAT SERPL-MCNC: 0.92 MG/DL (ref 0.57–1)
DEPRECATED RDW RBC AUTO: 41.1 FL (ref 37–54)
EGFRCR SERPLBLD CKD-EPI 2021: 80.9 ML/MIN/1.73
ERYTHROCYTE [DISTWIDTH] IN BLOOD BY AUTOMATED COUNT: 12.6 % (ref 12.3–15.4)
GLOBULIN UR ELPH-MCNC: 2.8 GM/DL
GLUCOSE SERPL-MCNC: 97 MG/DL (ref 65–99)
HCT VFR BLD AUTO: 38.4 % (ref 34–46.6)
HGB BLD-MCNC: 13.1 G/DL (ref 12–15.9)
MCH RBC QN AUTO: 30.5 PG (ref 26.6–33)
MCHC RBC AUTO-ENTMCNC: 34.1 G/DL (ref 31.5–35.7)
MCV RBC AUTO: 89.3 FL (ref 79–97)
PLATELET # BLD AUTO: 198 10*3/MM3 (ref 140–450)
PMV BLD AUTO: 12.2 FL (ref 6–12)
POTASSIUM SERPL-SCNC: 3.6 MMOL/L (ref 3.5–5.2)
PROT SERPL-MCNC: 6.7 G/DL (ref 6–8.5)
RBC # BLD AUTO: 4.3 10*6/MM3 (ref 3.77–5.28)
RH BLD: POSITIVE
SODIUM SERPL-SCNC: 141 MMOL/L (ref 136–145)
T4 FREE SERPL-MCNC: 1.09 NG/DL (ref 0.92–1.68)
TSH SERPL DL<=0.05 MIU/L-ACNC: 0.72 UIU/ML (ref 0.27–4.2)
WBC NRBC COR # BLD AUTO: 3.13 10*3/MM3 (ref 3.4–10.8)

## 2024-06-10 PROCEDURE — 85027 COMPLETE CBC AUTOMATED: CPT

## 2024-06-10 PROCEDURE — 84439 ASSAY OF FREE THYROXINE: CPT

## 2024-06-10 PROCEDURE — 1160F RVW MEDS BY RX/DR IN RCRD: CPT | Performed by: PHYSICIAN ASSISTANT

## 2024-06-10 PROCEDURE — 80053 COMPREHEN METABOLIC PANEL: CPT

## 2024-06-10 PROCEDURE — 1159F MED LIST DOCD IN RCRD: CPT | Performed by: PHYSICIAN ASSISTANT

## 2024-06-10 PROCEDURE — 84443 ASSAY THYROID STIM HORMONE: CPT

## 2024-06-10 PROCEDURE — 86901 BLOOD TYPING SEROLOGIC RH(D): CPT

## 2024-06-10 PROCEDURE — 86900 BLOOD TYPING SEROLOGIC ABO: CPT

## 2024-06-10 PROCEDURE — 1126F AMNT PAIN NOTED NONE PRSNT: CPT | Performed by: PHYSICIAN ASSISTANT

## 2024-06-10 PROCEDURE — 36415 COLL VENOUS BLD VENIPUNCTURE: CPT

## 2024-06-10 PROCEDURE — 99214 OFFICE O/P EST MOD 30 MIN: CPT | Performed by: PHYSICIAN ASSISTANT

## 2024-06-10 RX ORDER — ESTRADIOL 2 MG/1
2 TABLET ORAL DAILY
Qty: 30 TABLET | Refills: 11 | Status: SHIPPED | OUTPATIENT
Start: 2024-06-10

## 2024-06-10 NOTE — PROGRESS NOTES
Subjective   Charlee Mackay is a 38 y.o. female  Hypertension (Concerned about increased bp with headaches and blurry vision x2 weeks ) and hormone replacement  (Refill on estradiol )      History of Present Illness  History of Present Illness  The patient is coming in today for evaluation of blood pressure and for refills of medication.    The patient experienced an episode of elevated blood pressure during a visit to her mother's house, a symptom she had not previously encountered. These episodes typically manifest as a severe migraine, accompanied by visual disturbances such as lights and spots. These episodes typically resolve within 15 to 30 minutes, after which she applies an ice pack behind the back of her neck and lies down. These episodes are brief, and she reports feeling unwell for the remainder of the day. She has a scheduled appointment with a cardiologist tomorrow.    The patient recently exhausted her supply of hormone therapy.    The patient has been experiencing constipation since her hysterectomy, which has led to a flare-up of her hemorrhoids. She attempts to avoid straining, but this results in constipation. Her bowel movements are typically once daily.    The following portions of the patient's history were reviewed and updated as appropriate: allergies, current medications, past social history and problem list    Review of Systems   Constitutional:  Negative for activity change, fatigue and unexpected weight change.   HENT:  Negative for congestion, dental problem, postnasal drip, sinus pressure and sore throat.    Eyes:  Negative for photophobia, pain and visual disturbance.   Gastrointestinal:  Positive for constipation. Negative for nausea and vomiting.   Endocrine: Negative for heat intolerance (stable on estrodial).   Neurological:  Positive for headaches. Negative for dizziness, syncope, facial asymmetry, speech difficulty, weakness, light-headedness and numbness.    Psychiatric/Behavioral:  Negative for agitation, confusion, dysphoric mood and sleep disturbance. The patient is not nervous/anxious.        Objective     Vitals:    06/10/24 0956   BP: 132/88   Pulse: 67   Temp: 97.8 °F (36.6 °C)   SpO2: 99%       Physical Exam  Vitals and nursing note reviewed.   Constitutional:       General: She is not in acute distress.     Appearance: Normal appearance. She is well-developed. She is not ill-appearing, toxic-appearing or diaphoretic.   HENT:      Head: Normocephalic and atraumatic.   Eyes:      Conjunctiva/sclera: Conjunctivae normal.      Pupils: Pupils are equal, round, and reactive to light.   Cardiovascular:      Rate and Rhythm: Normal rate and regular rhythm.   Pulmonary:      Effort: Pulmonary effort is normal.      Breath sounds: Normal breath sounds.   Musculoskeletal:      Cervical back: Normal range of motion and neck supple. No rigidity.   Neurological:      Mental Status: She is alert and oriented to person, place, and time.      Cranial Nerves: No cranial nerve deficit.      Sensory: No sensory deficit.      Motor: No weakness.      Coordination: Coordination normal.   Psychiatric:         Attention and Perception: She is attentive.         Mood and Affect: Mood normal.         Speech: Speech normal.         Behavior: Behavior normal.         Thought Content: Thought content normal.         Judgment: Judgment normal.       Physical Exam      Assessment & Plan   Assessment & Plan  1. Hypertension.  The patient was advised to acquire a blood pressure cuff for home use. A consultation with her cardiologist was recommended to determine if adjustments to her metoprolol dosage are necessary. Additionally, blood work was ordered to assess her electrolyte levels, glucose levels, blood count, and kidney function.    2. Migraine.  A sample of effective migraine medication, Nurtec 75mg was provided.    3. Hormone replacement therapy.  Prescriptions for 1-year supply of  hormone therapy were provided.    4. Constipation.  Medication was prescribed for the management of constipation.    Patient will schedule an appointment with Dr. Ronald Escobar for her annual gynecological exam.    There are no diagnoses linked to this encounter.     Patient or patient representative verbalized consent for the use of Ambient Listening during the visit with  Jackie Finch PA-C for chart documentation. 6/10/2024  10:24 EDT

## 2024-06-11 ENCOUNTER — OFFICE VISIT (OUTPATIENT)
Dept: CARDIOLOGY | Facility: HOSPITAL | Age: 39
End: 2024-06-11
Payer: COMMERCIAL

## 2024-06-11 ENCOUNTER — HOSPITAL ENCOUNTER (OUTPATIENT)
Dept: CARDIOLOGY | Facility: HOSPITAL | Age: 39
Discharge: HOME | End: 2024-06-11
Payer: COMMERCIAL

## 2024-06-11 VITALS
BODY MASS INDEX: 28.58 KG/M2 | TEMPERATURE: 98.2 F | HEIGHT: 61 IN | OXYGEN SATURATION: 99 % | HEART RATE: 60 BPM | WEIGHT: 151.38 LBS | RESPIRATION RATE: 16 BRPM | SYSTOLIC BLOOD PRESSURE: 135 MMHG | DIASTOLIC BLOOD PRESSURE: 90 MMHG

## 2024-06-11 DIAGNOSIS — G47.09 OTHER INSOMNIA: ICD-10-CM

## 2024-06-11 DIAGNOSIS — I49.3 PVC'S (PREMATURE VENTRICULAR CONTRACTIONS): Primary | ICD-10-CM

## 2024-06-11 DIAGNOSIS — R00.0 RACING HEART BEAT: ICD-10-CM

## 2024-06-11 DIAGNOSIS — I49.3 PVC'S (PREMATURE VENTRICULAR CONTRACTIONS): ICD-10-CM

## 2024-06-11 PROCEDURE — 93005 ELECTROCARDIOGRAM TRACING: CPT | Performed by: NURSE PRACTITIONER

## 2024-06-11 RX ORDER — TRAZODONE HYDROCHLORIDE 50 MG/1
50 TABLET ORAL NIGHTLY
Qty: 30 TABLET | Refills: 1 | Status: SHIPPED | OUTPATIENT
Start: 2024-06-11

## 2024-06-11 NOTE — PROGRESS NOTES
"Chief Complaint  Follow-up and Irregular Heart Beat    Subjective    History of Present Illness {CC  Problem List  Visit  Diagnosis   Encounters  Notes  Medications  Labs  Result Review Imaging  Media :23}       History of Present Illness   38 year old female presents to the office today for ongoing evaluation of her palpitations.  She reports palpitations started roughly 3 weeks ago and have continued to worsen.  She reports they occur on a daily basis frequently throughout the day.  She reports some associated fatigue and dyspnea on exertion.  Also reports that 3 weeks ago she had trouble sleeping.  Notes that she has trouble falling asleep as well as staying asleep if she does well sleep.  Currently not drinking caffeine and does hydrate well throughout the day.  Denies chest pain, presyncope or syncope.  Notes compliance with 6.25 mg of metoprolol succinate.  Does report the resting heart rate is 60s.  Does also report headaches started roughly 3 weeks ago.  Headaches usually last 15 to 30 minutes at a time.  Objective     Vital Signs:   Vitals:    06/11/24 0946   BP: 135/90   BP Location: Left arm   Patient Position: Sitting   Cuff Size: Adult   Pulse: 60   Resp: 16   Temp: 98.2 °F (36.8 °C)   TempSrc: Temporal   SpO2: 99%   Weight: 68.7 kg (151 lb 6 oz)   Height: 154.9 cm (61\")     Body mass index is 28.6 kg/m².  Physical Exam  Vitals and nursing note reviewed.   Constitutional:       Appearance: Normal appearance.   HENT:      Head: Normocephalic.   Eyes:      Pupils: Pupils are equal, round, and reactive to light.   Cardiovascular:      Rate and Rhythm: Normal rate and regular rhythm.      Pulses: Normal pulses.      Heart sounds: Normal heart sounds. No murmur heard.  Pulmonary:      Effort: Pulmonary effort is normal.      Breath sounds: Normal breath sounds.   Abdominal:      General: Bowel sounds are normal.      Palpations: Abdomen is soft.   Musculoskeletal:         General: Normal range of " motion.      Cervical back: Normal range of motion.      Right lower leg: No edema.      Left lower leg: No edema.   Skin:     General: Skin is warm and dry.      Capillary Refill: Capillary refill takes less than 2 seconds.   Neurological:      Mental Status: She is alert and oriented to person, place, and time.   Psychiatric:         Mood and Affect: Mood normal.         Thought Content: Thought content normal.              Result Review  Data Reviewed:{ Labs  Result Review  Imaging  Med Tab  Media :23}     EKG today sinus with frequent PVCs at 66 bpm  Adult Transthoracic Echo Complete W/ Cont if Necessary Per Protocol (10/27/2023 09:04)  Holter Monitor - 72 Hour Up To 15 Days (10/18/2023 14:33)      Lab Results   Component Value Date    GLUCOSE 97 06/10/2024    CALCIUM 8.9 06/10/2024     06/10/2024    K 3.6 06/10/2024    CO2 26.8 06/10/2024     06/10/2024    BUN 8 06/10/2024    CREATININE 0.92 06/10/2024    EGFR 80.9 06/10/2024    BCR 8.7 06/10/2024    ANIONGAP 9.2 06/10/2024     Lab Results   Component Value Date    WBC 3.13 (L) 06/10/2024    HGB 13.1 06/10/2024    HCT 38.4 06/10/2024    MCV 89.3 06/10/2024     06/10/2024          Assessment and Plan {CC Problem List  Visit Diagnosis  ROS  Review (Popup)  Health Maintenance  Quality  BestPractice  Medications  SmartSets  SnapShot Encounters  Media :23}   1. PVC's (premature ventricular contractions)  Stop Toprol and begin metoprolol  tartrate 12.5 mg twice daily  - ECG 12 Lead; Future    2. Racing heart beat    - ECG 12 Lead; Future    3. Other insomnia  Begin trazodone 50 mg nightly        Follow Up {Instructions Charge Capture  Follow-up Communications :23}   Return in about 4 weeks (around 7/9/2024) for Telemedicine visit [facility] .    Patient was given instructions and counseling regarding her condition or for health maintenance advice. Please see specific information pulled into the AVS if appropriate.  Patient was  instructed to call the [facility] with any questions, concerns, or worsening symptoms.

## 2024-06-13 LAB
QT INTERVAL: 456 MS
QTC INTERVAL: 478 MS

## 2024-07-05 RX ORDER — METOPROLOL SUCCINATE 25 MG/1
12.5 TABLET, EXTENDED RELEASE ORAL DAILY
Qty: 30 TABLET | Refills: 3 | OUTPATIENT
Start: 2024-07-05

## 2024-07-25 ENCOUNTER — TELEMEDICINE (OUTPATIENT)
Dept: CARDIOLOGY | Facility: HOSPITAL | Age: 39
End: 2024-07-25
Payer: COMMERCIAL

## 2024-07-25 VITALS — HEART RATE: 64 BPM | BODY MASS INDEX: 28.51 KG/M2 | HEIGHT: 61 IN | WEIGHT: 151 LBS

## 2024-07-25 DIAGNOSIS — G47.09 OTHER INSOMNIA: ICD-10-CM

## 2024-07-25 DIAGNOSIS — I49.3 PVC'S (PREMATURE VENTRICULAR CONTRACTIONS): Primary | ICD-10-CM

## 2024-07-25 DIAGNOSIS — R00.0 RACING HEART BEAT: ICD-10-CM

## 2024-07-25 NOTE — PROGRESS NOTES
"Chief Complaint  Follow-up (pvcs)    Subjective    History of Present Illness {CC  Problem List  Visit  Diagnosis   Encounters  Notes  Medications  Labs  Result Review Imaging  Media :23}   You have chosen to receive care through the use of telemedicine. Telemedicine enables health care providers at different locations to provide safe, effective, and convenient care through the use of technology. As with any health care service, there are risks associated with the use of telemedicine, including equipment failure, poor connections, and  issues.    Do you understand the risks and benefits of telemedicine as I have explained them to you? Yes  Have your questions regarding telemedicine been answered? Yes  Do you consent to the use of telemedicine in your medical care today? Yes     History of Present Illness   38 year old female presents for telemedicine today for ongoing evaluation of her palpitations.  She reports palpitations started roughly 3 weeks ago and have continued to worsen.  She reports they occur on a daily basis frequently throughout the day.  She reports some associated fatigue and dyspnea on exertion.  Also reports that 3 weeks ago she had trouble sleeping.  Notes that she has trouble falling asleep as well as staying asleep if she does well sleep.  Currently not drinking caffeine and does hydrate well throughout the day.  Denies chest pain, presyncope or syncope.  Notes compliance with 6.25 mg of metoprolol succinate.  Does report the resting heart rate is 60s.  Does also report headaches started roughly 3 weeks ago.  Headaches usually last 15 to 30 minutes at a time. Notes that palpitations have improved with use of metoprolol. Notes that she did not sleep any better with use of trazadone.   Objective     Vital Signs:   Vitals:    07/25/24 1018   Pulse: 64   Weight: 68.5 kg (151 lb)   Height: 154.9 cm (61\")     Body mass index is 28.53 kg/m².  Physical Exam  Vitals and " nursing note reviewed.   Constitutional:       Appearance: Normal appearance.   HENT:      Head: Normocephalic.   Pulmonary:      Effort: Pulmonary effort is normal.   Neurological:      Mental Status: She is alert and oriented to person, place, and time.   Psychiatric:         Mood and Affect: Mood normal.         Behavior: Behavior normal.         Thought Content: Thought content normal.              Result Review  Data Reviewed:{ Labs  Result Review  Imaging  Med Tab  Media :23}     EKG today sinus with frequent PVCs at 66 bpm  Adult Transthoracic Echo Complete W/ Cont if Necessary Per Protocol (10/27/2023 09:04)  Holter Monitor - 72 Hour Up To 15 Days (10/18/2023 14:33)      Lab Results   Component Value Date    GLUCOSE 97 06/10/2024    CALCIUM 8.9 06/10/2024     06/10/2024    K 3.6 06/10/2024    CO2 26.8 06/10/2024     06/10/2024    BUN 8 06/10/2024    CREATININE 0.92 06/10/2024    EGFR 80.9 06/10/2024    BCR 8.7 06/10/2024    ANIONGAP 9.2 06/10/2024     Lab Results   Component Value Date    WBC 3.13 (L) 06/10/2024    HGB 13.1 06/10/2024    HCT 38.4 06/10/2024    MCV 89.3 06/10/2024     06/10/2024          Assessment and Plan {CC Problem List  Visit Diagnosis  ROS  Review (Popup)  Health Maintenance  Quality  BestPractice  Medications  SmartSets  SnapShot Encounters  Media :23}   1. PVC's (premature ventricular contractions)  Stable on  metoprolol  tartrate 12.5 mg twice daily      2. Racing heart beat  Continue metoprolol tartrate 12.5 mg bid   3. Other insomnia  Trazodone unsuccessful  Follow up with pcp         Follow Up {Instructions Charge Capture  Follow-up Communications :23}   Return in about 6 weeks (around 9/5/2024) for Telemedicine visit, pvcs.    Patient was given instructions and counseling regarding her condition or for health maintenance advice. Please see specific information pulled into the AVS if appropriate.  Patient was instructed to call the [facility] with any questions, concerns, or worsening symptoms.

## 2024-08-12 RX ORDER — TRAZODONE HYDROCHLORIDE 50 MG/1
50 TABLET ORAL NIGHTLY
Qty: 30 TABLET | Refills: 1 | OUTPATIENT
Start: 2024-08-12

## 2024-08-12 RX ORDER — TRAZODONE HYDROCHLORIDE 50 MG/1
50 TABLET ORAL NIGHTLY
Qty: 30 TABLET | Refills: 11 | Status: SHIPPED | OUTPATIENT
Start: 2024-08-12

## 2024-09-05 ENCOUNTER — TELEMEDICINE (OUTPATIENT)
Dept: CARDIOLOGY | Facility: HOSPITAL | Age: 39
End: 2024-09-05
Payer: COMMERCIAL

## 2024-09-05 VITALS — HEART RATE: 67 BPM | BODY MASS INDEX: 28.51 KG/M2 | WEIGHT: 151 LBS | HEIGHT: 61 IN

## 2024-09-05 DIAGNOSIS — I49.3 PVC'S (PREMATURE VENTRICULAR CONTRACTIONS): Primary | ICD-10-CM

## 2024-09-05 DIAGNOSIS — R00.0 RACING HEART BEAT: ICD-10-CM

## 2024-09-05 RX ORDER — METOPROLOL TARTRATE 25 MG/1
25 TABLET, FILM COATED ORAL 2 TIMES DAILY
Qty: 60 TABLET | Refills: 3 | Status: SHIPPED | OUTPATIENT
Start: 2024-09-05

## 2024-09-05 NOTE — PROGRESS NOTES
"Chief Complaint  Follow-up (Pvcs, racing heart beat )    Subjective    History of Present Illness {CC  Problem List  Visit  Diagnosis   Encounters  Notes  Medications  Labs  Result Review Imaging  Media :23}   You have chosen to receive care through the use of telemedicine. Telemedicine enables health care providers at different locations to provide safe, effective, and convenient care through the use of technology. As with any health care service, there are risks associated with the use of telemedicine, including equipment failure, poor connections, and  issues.    Do you understand the risks and benefits of telemedicine as I have explained them to you? Yes  Have your questions regarding telemedicine been answered? Yes  Do you consent to the use of telemedicine in your medical care today? Yes     History of Present Illness   39 year old female presents for telemedicine today for ongoing evaluation of her palpitations.  She reports palpitations started roughly 6 weeks ago. Some improvement on metoprolol. She reports they occur on a daily basis frequently throughout the day.  She reports some associated fatigue and dyspnea on exertion.  Also reports that 3 weeks ago she had trouble sleeping.  Notes that she has trouble falling asleep as well as staying asleep if she does well sleep.  Currently not drinking caffeine and does hydrate well throughout the day.  Denies chest pain, presyncope or syncope.  Notes compliance with 12. 5mg of metoprolol tartrate.   Does report the resting heart rate is 60s.    Objective     Vital Signs:   Vitals:    09/05/24 1014   Pulse: 67   Weight: 68.5 kg (151 lb)   Height: 154.9 cm (61\")     Body mass index is 28.53 kg/m².  Physical Exam  Vitals and nursing note reviewed.   Constitutional:       Appearance: Normal appearance.   HENT:      Head: Normocephalic.   Pulmonary:      Effort: Pulmonary effort is normal.   Neurological:      Mental Status: She is alert " and oriented to person, place, and time.   Psychiatric:         Mood and Affect: Mood normal.         Behavior: Behavior normal.         Thought Content: Thought content normal.              Result Review  Data Reviewed:{ Labs  Result Review  Imaging  Med Tab  Media :23}     EKG sinus with frequent PVCs at 66 bpm  Adult Transthoracic Echo Complete W/ Cont if Necessary Per Protocol (10/27/2023 09:04)  Holter Monitor - 72 Hour Up To 15 Days (10/18/2023 14:33)      Lab Results   Component Value Date    GLUCOSE 97 06/10/2024    CALCIUM 8.9 06/10/2024     06/10/2024    K 3.6 06/10/2024    CO2 26.8 06/10/2024     06/10/2024    BUN 8 06/10/2024    CREATININE 0.92 06/10/2024    EGFR 80.9 06/10/2024    BCR 8.7 06/10/2024    ANIONGAP 9.2 06/10/2024     Lab Results   Component Value Date    WBC 3.13 (L) 06/10/2024    HGB 13.1 06/10/2024    HCT 38.4 06/10/2024    MCV 89.3 06/10/2024     06/10/2024          Assessment and Plan {CC Problem List  Visit Diagnosis  ROS  Review (Popup)  Health Maintenance  Quality  BestPractice  Medications  SmartSets  SnapShot Encounters  Media :23}   1. PVC's (premature ventricular contractions)  Increase metoprolol tartrate to 25 mg bid     2. Racing heart beat  Continue metoprolol tartrate 25mg bid         Follow Up {Instructions Charge Capture  Follow-up Communications :23}   Return in about 6 weeks (around 10/17/2024) for Telemedicine visit pvcs.    Patient was given instructions and counseling regarding her condition or for health maintenance advice. Please see specific information pulled into the AVS if appropriate.  Patient was instructed to call the [facility] with any questions, concerns, or worsening symptoms.

## 2024-09-06 ENCOUNTER — TELEPHONE (OUTPATIENT)
Dept: CARDIOLOGY | Facility: HOSPITAL | Age: 39
End: 2024-09-06
Payer: COMMERCIAL

## 2024-09-06 RX ORDER — AMLODIPINE BESYLATE 5 MG/1
5 TABLET ORAL DAILY
Qty: 30 TABLET | Refills: 11 | Status: SHIPPED | OUTPATIENT
Start: 2024-09-06

## 2024-09-06 NOTE — TELEPHONE ENCOUNTER
----- Message from Vida LOPEZ sent at 9/6/2024  9:50 AM EDT -----  Regarding: FW: Charlee Mackay  Contact: [phone]    ----- Message -----  From: Charlee Mackay  Sent: 9/6/2024   8:16 AM EDT  To: Mge [facility]  Subject: Charlee Mackay                                    Good morning Ms Gleason so I had an episode not too long ago. 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 My heart rate  is 50 she asked me was I taking anything for the heart rate pause I didn’t know that there were medicines that you could take for that Because I can’t keep up with my blood pressure because happening unexpectedly and I’m getting concerned about my blood pressure and I’m wondering is there anything else that we can do?

## 2024-10-04 ENCOUNTER — HOSPITAL ENCOUNTER (OUTPATIENT)
Dept: MAMMOGRAPHY | Facility: HOSPITAL | Age: 39
Discharge: HOME | End: 2024-10-04
Admitting: PHYSICIAN ASSISTANT
Payer: COMMERCIAL

## 2024-10-04 DIAGNOSIS — Z12.31 BREAST CANCER SCREENING BY MAMMOGRAM: ICD-10-CM

## 2024-10-04 PROCEDURE — 77067 SCR MAMMO BI INCL CAD: CPT

## 2024-10-04 PROCEDURE — 77063 BREAST TOMOSYNTHESIS BI: CPT

## 2024-10-17 ENCOUNTER — TELEMEDICINE (OUTPATIENT)
Dept: CARDIOLOGY | Facility: HOSPITAL | Age: 39
End: 2024-10-17
Payer: COMMERCIAL

## 2024-10-17 VITALS — HEART RATE: 65 BPM | WEIGHT: 151 LBS | BODY MASS INDEX: 28.51 KG/M2 | HEIGHT: 61 IN

## 2024-10-17 NOTE — PROGRESS NOTES
"Chief Complaint  Follow-up (pvcs)    Subjective    History of Present Illness {CC  Problem List  Visit  Diagnosis   Encounters  Notes  Medications  Labs  Result Review Imaging  Media :23}   You have chosen to receive care through the use of telemedicine. Telemedicine enables health care providers at different locations to provide safe, effective, and convenient care through the use of technology. As with any health care service, there are risks associated with the use of telemedicine, including equipment failure, poor connections, and  issues.    Do you understand the risks and benefits of telemedicine as I have explained them to you? Yes  Have your questions regarding telemedicine been answered? Yes  Do you consent to the use of telemedicine in your medical care today? Yes     History of Present Illness   39 year old female presents for telemedicine today for ongoing evaluation of her palpitations.  She reports improvement in palpitations on metoprolol. Notes that she was experiencing fatigue and cut her metoprolol tartrate down to 12.5 mg once a day and is no longer experiencing fatigue. Notes that she has not been checking her blood pressure.  Currently not drinking caffeine and does hydrate well throughout the day.  Denies chest pain, presyncope or syncope.  Notes compliance with 12. 5mg of metoprolol tartrate.   Does report the resting heart rate is 60s.    Objective     Vital Signs:   Vitals:    10/17/24 1004   Pulse: 65   Weight: 68.5 kg (151 lb)   Height: 154.9 cm (61\")     Body mass index is 28.53 kg/m².  Physical Exam  Vitals and nursing note reviewed.   Constitutional:       Appearance: Normal appearance.   HENT:      Head: Normocephalic.   Pulmonary:      Effort: Pulmonary effort is normal.   Neurological:      Mental Status: She is alert and oriented to person, place, and time.   Psychiatric:         Mood and Affect: Mood normal.         Behavior: Behavior normal.         " Thought Content: Thought content normal.              Result Review  Data Reviewed:{ Labs  Result Review  Imaging  Med Tab  Media :23}     EKG sinus with frequent PVCs at 66 bpm  Adult Transthoracic Echo Complete W/ Cont if Necessary Per Protocol (10/27/2023 09:04)  Holter Monitor - 72 Hour Up To 15 Days (10/18/2023 14:33)      Lab Results   Component Value Date    GLUCOSE 97 06/10/2024    CALCIUM 8.9 06/10/2024     06/10/2024    K 3.6 06/10/2024    CO2 26.8 06/10/2024     06/10/2024    BUN 8 06/10/2024    CREATININE 0.92 06/10/2024    EGFR 80.9 06/10/2024    BCR 8.7 06/10/2024    ANIONGAP 9.2 06/10/2024     Lab Results   Component Value Date    WBC 3.13 (L) 06/10/2024    HGB 13.1 06/10/2024    HCT 38.4 06/10/2024    MCV 89.3 06/10/2024     06/10/2024          Assessment and Plan {CC Problem List  Visit Diagnosis  ROS  Review (Popup)  Health Maintenance  Quality  BestPractice  Medications  SmartSets  SnapShot Encounters  Media :23}   1. PVC's (premature ventricular contractions)  Continue metoprolol 25 mg once daily    2. Racing heart beat  Continue metoprolol tartrate 25mg once daily  Did discuss transitioning to metoprolol succinate if pvcs worsen on once a day dosing of metoprolol tartrate         Follow Up {Instructions Charge Capture  Follow-up Communications :23}   Return if symptoms worsen or fail to improve.    Patient was given instructions and counseling regarding her condition or for health maintenance advice. Please see specific information pulled into the AVS if appropriate.  Patient was instructed to call the [facility] with any questions, concerns, or worsening symptoms.

## 2024-10-30 PROBLEM — Z01.419 WOMEN'S ANNUAL ROUTINE GYNECOLOGICAL EXAMINATION: Status: ACTIVE | Noted: 2024-10-30

## 2024-10-30 NOTE — PROGRESS NOTES
Subjective     Chief Complaint   Patient presents with    Gynecologic Exam     New patient re establish care last seen          Charlee Mackay is a 39 y.o. year old  presenting to be seen for her annual exam.      Her LMP was No LMP recorded. Patient has had a hysterectomy.     She is sexually active.  In the past 12 months there have not been new sexual partners.  Condoms are not typically used.  She would not like to be screened for STD's at today's exam.     Current contraceptive methods being used: status post hysterectomy.      She exercises regularly: no.  She wears her seat belt: yes.  She has concerns about domestic violence: no.    GYN screening history:  Last mammogram: she reports her last mammogram was normal  Last fasting lipid profile: elevated  Last colonoscopy: she has never had a colonoscopy done  Last DEXA: she has never had a DEXA.    Additional Complaints:  Hemorrhoids  Health Maintenance, Female  Adopting a healthy lifestyle and getting preventive care are important in promoting health and wellness. Ask your health care provider about:  The right schedule for you to have regular tests and exams.  Things you can do on your own to prevent diseases and keep yourself healthy.  What should I know about diet, weight, and exercise?  Eat a healthy diet  Eat a diet that includes plenty of vegetables, fruits, low-fat dairy products, and lean protein.  Do not eat a lot of foods that are high in solid fats, added sugars, or sodium.  Maintain a healthy weight  Body mass index (BMI) is used to identify weight problems. It estimates body fat based on height and weight. Your health care provider can help determine your BMI and help you achieve or maintain a healthy weight.  Get regular exercise  Get regular exercise. This is one of the most important things you can do for your health. Most adults should:  Exercise for at least 150 minutes each week. The exercise should increase your heart rate and  make you sweat (moderate-intensity exercise).  Do strengthening exercises at least twice a week. This is in addition to the moderate-intensity exercise.  Spend less time sitting. Even light physical activity can be beneficial.  Watch cholesterol and blood lipids  Have your blood tested for lipids and cholesterol at 20 years of age, then have this test every 5 years.  Have your cholesterol levels checked more often if:  Your lipid or cholesterol levels are high.  You are older than 40 years of age.  You are at high risk for heart disease.  What should I know about cancer screening?  Depending on your health history and family history, you may need to have cancer screening at various ages. This may include screening for:  Breast cancer.  Cervical cancer.  Colorectal cancer.  Skin cancer.  Lung cancer.  What should I know about heart disease, diabetes, and high blood pressure?  Blood pressure and heart disease  High blood pressure causes heart disease and increases the risk of stroke. This is more likely to develop in people who have high blood pressure readings or are overweight.  Have your blood pressure checked:  Every 3-5 years if you are 18-39 years of age.  Every year if you are 40 years old or older.  Diabetes  Have regular diabetes screenings. This checks your fasting blood sugar level. Have the screening done:  Once every three years after age 40 if you are at a normal weight and have a low risk for diabetes.  More often and at a younger age if you are overweight or have a high risk for diabetes.  What should I know about preventing infection?  Hepatitis B  If you have a higher risk for hepatitis B, you should be screened for this virus. Talk with your health care provider to find out if you are at risk for hepatitis B infection.  Hepatitis C  Testing is recommended for:  Everyone born from 1945 through 1965.  Anyone with known risk factors for hepatitis C.  Sexually transmitted infections (STIs)  Get screened  for STIs, including gonorrhea and chlamydia, if:  You are sexually active and are younger than 24 years of age.  You are older than 24 years of age and your health care provider tells you that you are at risk for this type of infection.  Your sexual activity has changed since you were last screened, and you are at increased risk for chlamydia or gonorrhea. Ask your health care provider if you are at risk.  Ask your health care provider about whether you are at high risk for HIV. Your health care provider may recommend a prescription medicine to help prevent HIV infection. If you choose to take medicine to prevent HIV, you should first get tested for HIV. You should then be tested every 3 months for as long as you are taking the medicine.  Pregnancy  If you are about to stop having your period (premenopausal) and you may become pregnant, seek counseling before you get pregnant.  Take 400 to 800 micrograms (mcg) of folic acid every day if you become pregnant.  Ask for birth control (contraception) if you want to prevent pregnancy.  Osteoporosis and menopause  Osteoporosis is a disease in which the bones lose minerals and strength with aging. This can result in bone fractures. If you are 65 years old or older, or if you are at risk for osteoporosis and fractures, ask your health care provider if you should:  Be screened for bone loss.  Take a calcium or vitamin D supplement to lower your risk of fractures.  Be given hormone replacement therapy (HRT) to treat symptoms of menopause.  Follow these instructions at home:  Alcohol use  Do not drink alcohol if:  Your health care provider tells you not to drink.  You are pregnant, may be pregnant, or are planning to become pregnant.  If you drink alcohol:  Limit how much you have to:  0-1 drink a day.  Know how much alcohol is in your drink. In the [place] one drink equals one 12 oz bottle of beer (355 mL), one 5 oz glass of wine (148 mL), or one 1½ oz glass of hard liquor (44  mL).  Lifestyle  Do not use any products that contain nicotine or tobacco. These products include cigarettes, chewing tobacco, and vaping devices, such as e-cigarettes. If you need help quitting, ask your health care provider.  Do not use street drugs.  Do not share needles.  Ask your health care provider for help if you need support or information about quitting drugs.  General instructions  Schedule regular health, dental, and eye exams.  Stay current with your vaccines.  Tell your health care provider if:  You often feel depressed.  You have ever been abused or do not feel safe at home.  Summary  Adopting a healthy lifestyle and getting preventive care are important in promoting health and wellness.  Follow your health care provider's instructions about healthy diet, exercising, and getting tested or screened for diseases.  Follow your health care provider's instructions on monitoring your cholesterol and blood pressure.     The following portions of the patient's history were reviewed and updated as appropriate:vital signs, allergies, current medications, past medical history, past social history, past surgical history, and problem list.    Review of Systems  Gastrointestinal: positive for constipation     Physical Exam    Objective     /70   Wt 68.5 kg (151 lb)   Breastfeeding No   BMI 28.53 kg/m²       General:  well developed; well nourished  no acute distress  mentation appropriate   Constitutional: healthy   Skin:  No suspicious lesions seen   Thyroid: normal to inspection and palpation   Lungs:  breathing is unlabored  clear to auscultation bilaterally   Heart:  regular rate and rhythm, S1, S2 normal, no murmur, click, rub or gallop   Breasts:  Examined in supine position  Symmetric without masses or skin dimpling  Nipples normal without inversion, lesions or discharge  There are no palpable axillary nodes   Abdomen: soft, non-tender; no masses  no umbilical or inginual hernias are present  no  hepato-splenomegaly   Pelvis: Clinical staff was present for exam  External genitalia:  normal appearance of the external genitalia including Bartholin's and [place]'s glands.  :  urethral meatus normal; urethral hypermobility is absent.  Vaginal:  normal pink mucosa without prolapse or lesions.  Cervix:  absent  Uterus:  absent  Adnexa:  normal bimanual exam of the adnexa.   Musculoskeletal: negative   Neuro: normal without focal findings, mental status, speech normal, alert and oriented x3, and BAMBI   Psych: oriented to time, place and person, mood and affect are within normal limits, pt is a good historian; no memory problems were noted       Lab Review   CBC, CMP, TSH, and FLP    Imaging  Mammogram report    Assessment & Plan     ASSESSMENT  1. Women's annual routine gynecological examination    2. Surgical menopause, symptomatic    3. Hemorrhoids, unspecified hemorrhoid type        PLAN  Orders Placed This Encounter   Procedures    DEXA Bone Density Axial     Standing Status:   Future     Standing Expiration Date:   10/31/2025     Order Specific Question:   Reason for Exam:     Answer:   at risk for osteoporosis     Order Specific Question:   Patient Pregnant     Answer:   No     Order Specific Question:   Release to patient     Answer:   Routine Release [[id number]]     Order Specific Question:   Is patient taking or have taken long term Glucocorticoid (steroids)?     Answer:   No     Order Specific Question:   Does the patient have rheumatoid arthritis?     Answer:   No     Order Specific Question:   Does the patient have secondary osteoporosis?     Answer:   No    Ambulatory Referral to Colorectal Surgery     Referral Priority:   Routine     Referral Type:   Consultation     Referral Reason:   Specialty Services Required     Requested Specialty:   Colon and Rectal Surgery     Number of Visits Requested:   1     No orders of the defined types were placed in this encounter.        Follow up: 1 year(s)          This note was electronically signed.    Troy Escobar MD  October 31, 2024

## 2024-10-31 ENCOUNTER — OFFICE VISIT (OUTPATIENT)
Dept: OBSTETRICS AND GYNECOLOGY | Facility: CLINIC | Age: 39
End: 2024-10-31
Payer: COMMERCIAL

## 2024-10-31 VITALS — WEIGHT: 151 LBS | SYSTOLIC BLOOD PRESSURE: 122 MMHG | DIASTOLIC BLOOD PRESSURE: 70 MMHG | BODY MASS INDEX: 28.53 KG/M2

## 2024-10-31 DIAGNOSIS — K64.9 HEMORRHOIDS, UNSPECIFIED HEMORRHOID TYPE: ICD-10-CM

## 2024-10-31 DIAGNOSIS — E89.41 SURGICAL MENOPAUSE, SYMPTOMATIC: ICD-10-CM

## 2024-10-31 DIAGNOSIS — Z01.419 WOMEN'S ANNUAL ROUTINE GYNECOLOGICAL EXAMINATION: Primary | ICD-10-CM

## 2024-11-06 NOTE — TELEPHONE ENCOUNTER
Last visit 10/17/24, no follow up scheduled. Please refill if appropriate, deny, re-route.  Apolinar Dawn MA

## 2024-11-07 RX ORDER — METOPROLOL TARTRATE 25 MG/1
25 TABLET, FILM COATED ORAL 2 TIMES DAILY
Qty: 60 TABLET | Refills: 3 | Status: SHIPPED | OUTPATIENT
Start: 2024-11-07

## 2025-01-17 RX ORDER — ESTRADIOL 2 MG/1
2 TABLET ORAL DAILY
Qty: 30 TABLET | Refills: 11 | Status: SHIPPED | OUTPATIENT
Start: 2025-01-17

## 2025-01-23 RX ORDER — METOPROLOL TARTRATE 25 MG/1
25 TABLET, FILM COATED ORAL 2 TIMES DAILY
Qty: 60 TABLET | Refills: 3 | Status: SHIPPED | OUTPATIENT
Start: 2025-01-23

## 2025-01-23 NOTE — TELEPHONE ENCOUNTER
Last visit 10/17/24, no follow up on file. Please refill if appropriate, deny or re-route.  Apolinar Dawn MA

## 2025-01-29 RX ORDER — AMLODIPINE BESYLATE 5 MG/1
10 TABLET ORAL DAILY
Start: 2025-01-29